# Patient Record
Sex: MALE | Race: WHITE | NOT HISPANIC OR LATINO | Employment: PART TIME | URBAN - METROPOLITAN AREA
[De-identification: names, ages, dates, MRNs, and addresses within clinical notes are randomized per-mention and may not be internally consistent; named-entity substitution may affect disease eponyms.]

---

## 2018-06-18 ENCOUNTER — HOSPITAL ENCOUNTER (EMERGENCY)
Facility: HOSPITAL | Age: 18
End: 2018-06-19
Attending: EMERGENCY MEDICINE | Admitting: EMERGENCY MEDICINE
Payer: COMMERCIAL

## 2018-06-18 DIAGNOSIS — R45.851 SUICIDAL IDEATION: ICD-10-CM

## 2018-06-18 DIAGNOSIS — F32.A DEPRESSION: Primary | ICD-10-CM

## 2018-06-18 LAB
BASOPHILS # BLD AUTO: 0.02 THOUSANDS/ΜL (ref 0–0.1)
BASOPHILS NFR BLD AUTO: 0 % (ref 0–1)
EOSINOPHIL # BLD AUTO: 0.03 THOUSAND/ΜL (ref 0–0.61)
EOSINOPHIL NFR BLD AUTO: 1 % (ref 0–6)
ERYTHROCYTE [DISTWIDTH] IN BLOOD BY AUTOMATED COUNT: 11.8 % (ref 11.6–15.1)
HCT VFR BLD AUTO: 48.5 % (ref 36.5–49.3)
HGB BLD-MCNC: 16.4 G/DL (ref 12–17)
IMM GRANULOCYTES # BLD AUTO: 0 THOUSAND/UL (ref 0–0.2)
IMM GRANULOCYTES NFR BLD AUTO: 0 % (ref 0–2)
LYMPHOCYTES # BLD AUTO: 2.17 THOUSANDS/ΜL (ref 0.6–4.47)
LYMPHOCYTES NFR BLD AUTO: 34 % (ref 14–44)
MCH RBC QN AUTO: 30.3 PG (ref 26.8–34.3)
MCHC RBC AUTO-ENTMCNC: 33.8 G/DL (ref 31.4–37.4)
MCV RBC AUTO: 90 FL (ref 82–98)
MONOCYTES # BLD AUTO: 0.44 THOUSAND/ΜL (ref 0.17–1.22)
MONOCYTES NFR BLD AUTO: 7 % (ref 4–12)
NEUTROPHILS # BLD AUTO: 3.72 THOUSANDS/ΜL (ref 1.85–7.62)
NEUTS SEG NFR BLD AUTO: 58 % (ref 43–75)
NRBC BLD AUTO-RTO: 0 /100 WBCS
PLATELET # BLD AUTO: 261 THOUSANDS/UL (ref 149–390)
PMV BLD AUTO: 9.6 FL (ref 8.9–12.7)
RBC # BLD AUTO: 5.42 MILLION/UL (ref 3.88–5.62)
WBC # BLD AUTO: 6.38 THOUSAND/UL (ref 4.31–10.16)

## 2018-06-18 PROCEDURE — 36415 COLL VENOUS BLD VENIPUNCTURE: CPT | Performed by: EMERGENCY MEDICINE

## 2018-06-18 PROCEDURE — 85025 COMPLETE CBC W/AUTO DIFF WBC: CPT | Performed by: EMERGENCY MEDICINE

## 2018-06-18 PROCEDURE — 80320 DRUG SCREEN QUANTALCOHOLS: CPT | Performed by: EMERGENCY MEDICINE

## 2018-06-18 PROCEDURE — 80053 COMPREHEN METABOLIC PANEL: CPT | Performed by: EMERGENCY MEDICINE

## 2018-06-18 RX ORDER — SERTRALINE HYDROCHLORIDE 100 MG/1
100 TABLET, FILM COATED ORAL
COMMUNITY
End: 2021-12-12

## 2018-06-18 RX ORDER — CLONIDINE HYDROCHLORIDE 0.1 MG/1
0.1 TABLET ORAL
COMMUNITY

## 2018-06-18 RX ORDER — METHYLPHENIDATE HYDROCHLORIDE 30 MG/1
30 CAPSULE, EXTENDED RELEASE ORAL EVERY MORNING
COMMUNITY
End: 2021-12-12

## 2018-06-19 VITALS
WEIGHT: 175 LBS | HEART RATE: 95 BPM | HEIGHT: 73 IN | SYSTOLIC BLOOD PRESSURE: 121 MMHG | RESPIRATION RATE: 18 BRPM | OXYGEN SATURATION: 97 % | DIASTOLIC BLOOD PRESSURE: 70 MMHG | TEMPERATURE: 97.5 F | BODY MASS INDEX: 23.19 KG/M2

## 2018-06-19 LAB
ALBUMIN SERPL BCP-MCNC: 4.9 G/DL (ref 3.5–5)
ALP SERPL-CCNC: 88 U/L (ref 46–484)
ALT SERPL W P-5'-P-CCNC: 20 U/L (ref 12–78)
AMPHETAMINES SERPL QL SCN: NEGATIVE
ANION GAP SERPL CALCULATED.3IONS-SCNC: 11 MMOL/L (ref 4–13)
AST SERPL W P-5'-P-CCNC: 19 U/L (ref 5–45)
BARBITURATES UR QL: NEGATIVE
BENZODIAZ UR QL: NEGATIVE
BILIRUB SERPL-MCNC: 0.7 MG/DL (ref 0.2–1)
BILIRUB UR QL STRIP: NEGATIVE
BUN SERPL-MCNC: 13 MG/DL (ref 5–25)
CALCIUM SERPL-MCNC: 9.6 MG/DL (ref 8.3–10.1)
CHLORIDE SERPL-SCNC: 98 MMOL/L (ref 100–108)
CLARITY UR: CLEAR
CO2 SERPL-SCNC: 29 MMOL/L (ref 21–32)
COCAINE UR QL: NEGATIVE
COLOR UR: YELLOW
CREAT SERPL-MCNC: 0.98 MG/DL (ref 0.6–1.3)
ETHANOL SERPL-MCNC: <3 MG/DL (ref 0–3)
GFR SERPL CREATININE-BSD FRML MDRD: 111 ML/MIN/1.73SQ M
GLUCOSE SERPL-MCNC: 89 MG/DL (ref 65–140)
GLUCOSE UR STRIP-MCNC: NEGATIVE MG/DL
HGB UR QL STRIP.AUTO: NEGATIVE
KETONES UR STRIP-MCNC: NEGATIVE MG/DL
LEUKOCYTE ESTERASE UR QL STRIP: NEGATIVE
METHADONE UR QL: NEGATIVE
NITRITE UR QL STRIP: NEGATIVE
OPIATES UR QL SCN: NEGATIVE
PCP UR QL: NEGATIVE
PH UR STRIP.AUTO: 6.5 [PH] (ref 5–9)
POTASSIUM SERPL-SCNC: 3.5 MMOL/L (ref 3.5–5.3)
PROT SERPL-MCNC: 8.4 G/DL (ref 6.4–8.2)
PROT UR STRIP-MCNC: NEGATIVE MG/DL
SODIUM SERPL-SCNC: 138 MMOL/L (ref 136–145)
SP GR UR STRIP.AUTO: <=1.005 (ref 1–1.03)
THC UR QL: NEGATIVE
UROBILINOGEN UR QL STRIP.AUTO: 0.2 E.U./DL

## 2018-06-19 PROCEDURE — 81003 URINALYSIS AUTO W/O SCOPE: CPT | Performed by: EMERGENCY MEDICINE

## 2018-06-19 PROCEDURE — 80307 DRUG TEST PRSMV CHEM ANLYZR: CPT | Performed by: EMERGENCY MEDICINE

## 2018-06-19 PROCEDURE — 99285 EMERGENCY DEPT VISIT HI MDM: CPT

## 2018-06-19 RX ORDER — CLONIDINE HYDROCHLORIDE 0.1 MG/1
0.1 TABLET ORAL ONCE
Status: COMPLETED | OUTPATIENT
Start: 2018-06-19 | End: 2018-06-19

## 2018-06-19 RX ORDER — ACETAMINOPHEN 325 MG/1
650 TABLET ORAL ONCE
Status: COMPLETED | OUTPATIENT
Start: 2018-06-19 | End: 2018-06-19

## 2018-06-19 RX ORDER — METHYLPHENIDATE HYDROCHLORIDE 5 MG/1
15 TABLET ORAL
Status: DISCONTINUED | OUTPATIENT
Start: 2018-06-19 | End: 2018-06-19 | Stop reason: HOSPADM

## 2018-06-19 RX ADMIN — SERTRALINE HYDROCHLORIDE 100 MG: 50 TABLET ORAL at 02:30

## 2018-06-19 RX ADMIN — ACETAMINOPHEN 650 MG: 325 TABLET ORAL at 02:21

## 2018-06-19 RX ADMIN — METHYLPHENIDATE HYDROCHLORIDE 15 MG: 5 TABLET ORAL at 09:01

## 2018-06-19 RX ADMIN — CLONIDINE HYDROCHLORIDE 0.1 MG: 0.1 TABLET ORAL at 02:30

## 2018-06-19 NOTE — ED NOTES
[facility] at bedside  Breakfast tray collected, 100% of meal consumed        Nikolas Nuñez RN  06/19/18 4214

## 2018-06-19 NOTE — ED NOTES
Pt pleasant and cooperative but very soft spoken  Requested to have his door locked so he could sleep    States he would feel safer     Iwona Rangel RN  06/19/18 7433

## 2018-06-19 NOTE — EMTALA/ACUTE CARE TRANSFER
[address] [address]  Dept: [phone]      EMTALA TRANSFER CONSENT    NAME Delilah Baum                                         2000                              MRN [id number]    I have been informed of my rights regarding examination, treatment, and transfer   by Dr Dara Ledezma DO    Benefits: Specialized equipment and/or services available at the receiving facility (Include comment)________________________    Risks: Potential for delay in receiving treatment, Potential deterioration of medical condition, Increased discomfort during transfer, Possible worsening of condition or death during transfer      Consent for Transfer:  I acknowledge that my medical condition has been evaluated and explained to me by the emergency department physician or other qualified medical person and/or my attending physician, who has recommended that I be transferred to the service of  Accepting Physician: Dr River Dietrich at [facility] Name, [address] : Stephanie Saravia  The above potential benefits of such transfer, the potential risks associated with such transfer, and the probable risks of not being transferred have been explained to me, and I fully understand them  The doctor has explained that, in my case, the benefits of transfer outweigh the risks  I agree to be transferred  I authorize the performance of emergency medical procedures and treatments upon me in both transit and upon arrival at the receiving facility  Additionally, I authorize the release of any and all medical records to the receiving facility and request they be transported with me, if possible  I understand that the safest mode of transportation during a medical emergency is an ambulance and that the Hospital advocates the use of this mode of transport   Risks of traveling to the receiving facility by car, including absence of medical control, life sustaining equipment, such as oxygen, and medical personnel has been explained to me and I fully understand them  (CHHAYA CORRECT BOX BELOW)  [  ]  I consent to the stated transfer and to be transported by ambulance/helicopter  [  ]  I consent to the stated transfer, but refuse transportation by ambulance and accept full responsibility for my transportation by car  I understand the risks of non-ambulance transfers and I exonerate the Hospital and its staff from any deterioration in my condition that results from this refusal     X___________________________________________    DATE  18  TIME________  Signature of patient or legally responsible individual signing on patient behalf           RELATIONSHIP TO PATIENT_________________________          Provider Certification    NAME Marcia Perry                                         2000                              MRN [id number]    A medical screening exam was performed on the above named patient  Based on the examination:    Condition Necessitating Transfer The primary encounter diagnosis was Depression  A diagnosis of Suicidal ideation was also pertinent to this visit      Patient Condition: The patient has been stabilized such that within reasonable medical probability, no material deterioration of the patient condition or the condition of the unborn child(flor) is likely to result from the transfer    Reason for Transfer: Level of Care needed not available at this facility    Transfer Requirements: [address]   · Space available and qualified personnel available for treatment as acknowledged by Stable, calm, cooperative  · Agreed to accept transfer and to provide appropriate medical treatment as acknowledged by       Dr Nhi Soto  · Appropriate medical records of the examination and treatment of the patient are provided at the time of transfer   [address] _______  · Transfer will be performed by qualified personnel from [facility]  and appropriate transfer equipment as required, including the use of necessary and appropriate life support measures  Provider Certification: I have examined the patient and explained the following risks and benefits of being transferred/refusing transfer to the patient/family:  The patient is stable for psychiatric transfer because they are medically stable, and is protected from harming him/herself or others during transport, General risk, such as traffic hazards, adverse weather conditions, rough terrain or turbulence, possible failure of equipment (including vehicle or aircraft), or consequences of actions of persons outside the control of the transport personnel, Unanticipated needs of medical equipment and personnel during transport, Risk of worsening condition, The possibility of a transport vehicle being unavailable      Based on these reasonable risks and benefits to the patient and/or the unborn child(flor), and based upon the information available at the time of the patients examination, I certify that the medical benefits reasonably to be expected from the provision of appropriate medical treatments at another medical facility outweigh the increasing risks, if any, to the individuals medical condition, and in the case of labor to the unborn child, from effecting the transfer      X____________________________________________ DATE 06/19/18        TIME_______      ORIGINAL - SEND TO MEDICAL RECORDS   COPY - SEND WITH PATIENT DURING TRANSFER

## 2018-06-19 NOTE — ED PROVIDER NOTES
History  Chief Complaint   Patient presents with    Psychiatric Evaluation     Brought by mother  Both patient and mother are crying  Has been taking Zoloft since  and being seen by a therapist for a month  Mother states a family cat  2 weeks ago and patient " became a mess "   Told mother he has been cutting self and told his mother today that he has wanted to harm himself for 2-3 days  He states he would overdose  64-year-old male brought in by his mother for evaluation of depression and suicidal ideation  Both patient and mother are crying  Has been taking Zoloft since  and being seen by a therapist for a month  Mother states a family cat  2 weeks ago and patient " became a mess "   Told mother he has been cutting self and told his mother today that he has wanted to harm himself for 2-3 days  He states he would overdose  Patient denies drugs or alcohol  Compliant with medications  History of anxiety as well as depression  Decreased appetite and decreased activity level  History provided by:  Patient  Psychiatric Evaluation   Presenting symptoms: depression, self-mutilation and suicidal thoughts    Patient accompanied by:  Family member  Degree of incapacity (severity): Moderate  Onset quality:  Gradual  Duration:  2 weeks  Timing:  Constant  Progression:  Worsening  Chronicity:  Recurrent  Context: stressful life event    Context: not alcohol use, not drug abuse and not noncompliant    Treatment compliance:  Most of the time  Relieved by:  Nothing  Ineffective treatments:  Antidepressants  Associated symptoms: anxiety and appetite change    Associated symptoms: no chest pain and no feelings of worthlessness    Risk factors: no hx of suicide attempts        Prior to Admission Medications   Prescriptions Last Dose Informant Patient Reported? Taking?    cloNIDine (CATAPRES) 0 1 mg tablet 2018 at Unknown time Self Yes Yes   Sig: Take 0 1 mg by mouth daily at bedtime methylphenidate (RITALIN LA) 30 MG 24 hr capsule 6/18/2018 at Unknown time Self Yes Yes   Sig: Take 30 mg by mouth every morning   sertraline (ZOLOFT) 100 mg tablet 6/17/2018 at Unknown time Self Yes Yes   Sig: Take 100 mg by mouth daily at bedtime      Facility-Administered Medications: None       Past Medical History:   Diagnosis Date    ADHD     Anxiety disorder     Depression        History reviewed  No pertinent surgical history  History reviewed  No pertinent family history  I have reviewed and agree with the history as documented  Social History   Substance Use Topics    Smoking status: Never Smoker    Smokeless tobacco: Never Used    Alcohol use No        Review of Systems   Constitutional: Positive for appetite change  HENT: Negative  Respiratory: Negative  Cardiovascular: Negative  Negative for chest pain, palpitations and leg swelling  Gastrointestinal: Negative  Genitourinary: Negative  Musculoskeletal: Negative  Skin: Positive for wound  Self-inflicted wounds   Neurological: Negative  Hematological: Negative  Psychiatric/Behavioral: Positive for self-injury and suicidal ideas  The patient is nervous/anxious  All other systems reviewed and are negative  Physical Exam  Physical Exam   Constitutional: He is oriented to person, place, and time  He appears well-developed and well-nourished  HENT:   Head: Normocephalic and atraumatic  Right Ear: External ear normal    Left Ear: External ear normal    Nose: Nose normal    Mouth/Throat: Oropharynx is clear and moist    Eyes: Conjunctivae and EOM are normal    Neck: Normal range of motion  Neck supple  Cardiovascular: Normal rate, regular rhythm, normal heart sounds and intact distal pulses  Pulmonary/Chest: Effort normal and breath sounds normal    Abdominal: Soft  Bowel sounds are normal    Musculoskeletal: Normal range of motion     Neurological: He is alert and oriented to person, place, and time    Skin: Skin is warm and dry  Capillary refill takes less than 2 seconds  Superficial scratches to the right forearm that are well healed in over several weeks old  Psychiatric: Judgment normal  He is slowed  He exhibits a depressed mood  He expresses suicidal ideation  He expresses suicidal plans  Flat affect with poor eye contact, tearful   Nursing note and vitals reviewed  Vital Signs  ED Triage Vitals [06/18/18 2257]   Temperature Pulse Respirations Blood Pressure SpO2   99 1 °F (37 3 °C) 100 20 135/73 100 %      Temp Source Heart Rate Source Patient Position - Orthostatic VS BP Location FiO2 (%)   Tympanic Monitor Sitting Left arm --      Pain Score       6           Vitals:    06/18/18 2257 06/18/18 2324   BP: 135/73 135/86   Pulse: 100 89   Patient Position - Orthostatic VS: Sitting        Visual Acuity      ED Medications  Medications   acetaminophen (TYLENOL) tablet 650 mg (650 mg Oral Given 6/19/18 0221)   sertraline (ZOLOFT) tablet 100 mg (100 mg Oral Given 6/19/18 0230)   cloNIDine (CATAPRES) tablet 0 1 mg (0 1 mg Oral Given 6/19/18 0230)       Diagnostic Studies  Results Reviewed     Procedure Component Value Units Date/Time    Rapid drug screen, urine [[id number]]  (Normal) Collected:  06/19/18 0034    Lab Status:  Final result Specimen:  Urine from Urine, Clean Catch Updated:  06/19/18 0056     Amph/Meth UR Negative     Barbiturate Ur Negative     Benzodiazepine Urine Negative     Cocaine Urine Negative     Methadone Urine Negative     Opiate Urine Negative     PCP Ur Negative     THC Urine Negative    Narrative:         FOR MEDICAL PURPOSES ONLY  IF CONFIRMATION NEEDED PLEASE CONTACT THE LAB WITHIN 5 DAYS      Drug Screen Cutoff Levels:  AMPHETAMINE/METHAMPHETAMINES  1000 ng/mL  BARBITURATES     200 ng/mL  BENZODIAZEPINES     200 ng/mL  COCAINE      300 ng/mL  METHADONE      300 ng/mL  OPIATES      300 ng/mL  PHENCYCLIDINE     25 ng/mL  THC       50 ng/mL    UA w Reflex to Microscopic w Reflex to Culture [[id number]] Collected:  06/19/18 0034    Lab Status:  Final result Specimen:  Urine from Urine, Clean Catch Updated:  06/19/18 0044     Color, UA Yellow     Clarity, UA Clear     Specific Gravity, UA <=1 005     pH, UA 6 5     Leukocytes, UA Negative     Nitrite, UA Negative     Protein, UA Negative mg/dl      Glucose, UA Negative mg/dl      Ketones, UA Negative mg/dl      Urobilinogen, UA 0 2 E U /dl      Bilirubin, UA Negative     Blood, UA Negative    Ethanol [[id number]]  (Normal) Collected:  06/18/18 2328    Lab Status:  Final result Specimen:  Blood from Arm, Left Updated:  06/19/18 0018     Ethanol Lvl <3 0 mg/dL     Comprehensive metabolic panel [[id number]]  (Abnormal) Collected:  06/18/18 2328    Lab Status:  Final result Specimen:  Blood from Arm, Left Updated:  06/19/18 0009     Sodium 138 mmol/L      Potassium 3 5 mmol/L      Chloride 98 (L) mmol/L      CO2 29 mmol/L      Anion Gap 11 mmol/L      BUN 13 mg/dL      Creatinine 0 98 mg/dL      Glucose 89 mg/dL      Calcium 9 6 mg/dL      AST 19 U/L      ALT 20 U/L      Alkaline Phosphatase 88 U/L      Total Protein 8 4 (H) g/dL      Albumin 4 9 g/dL      Total Bilirubin 0 70 mg/dL      eGFR 111 ml/min/1 73sq m     Narrative:         National Kidney Disease Education Program recommendations are as follows:  GFR calculation is accurate only with a steady state creatinine  Chronic Kidney disease less than 60 ml/min/1 73 sq  meters  Kidney failure less than 15 ml/min/1 73 sq  meters      CBC and differential [[id number]] Collected:  06/18/18 2328    Lab Status:  Final result Specimen:  Blood from Arm, Left Updated:  06/18/18 2348     WBC 6 38 Thousand/uL      RBC 5 42 Million/uL      Hemoglobin 16 4 g/dL      Hematocrit 48 5 %      MCV 90 fL      MCH 30 3 pg      MCHC 33 8 g/dL      RDW 11 8 %      MPV 9 6 fL      Platelets 792 Thousands/uL      nRBC 0 /100 WBCs      Neutrophils Relative 58 %      Immat GRANS % 0 %      Lymphocytes Relative 34 % Monocytes Relative 7 %      Eosinophils Relative 1 %      Basophils Relative 0 %      Neutrophils Absolute 3 72 Thousands/µL      Immature Grans Absolute 0 00 Thousand/uL      Lymphocytes Absolute 2 17 Thousands/µL      Monocytes Absolute 0 44 Thousand/µL      Eosinophils Absolute 0 03 Thousand/µL      Basophils Absolute 0 02 Thousands/µL                  No orders to display              Procedures  Procedures       Phone Contacts  ED Phone Contact    ED Course                               MDM  Number of Diagnoses or Management Options  Diagnosis management comments: Medically cleared for crisis    CritCare Time    Disposition  Final diagnoses:   Depression   Suicidal ideation     Time reflects when diagnosis was documented in both MDM as applicable and the Disposition within this note     Time User Action Codes Description Comment    6/19/2018 12:37 AM Lilliana Cottrell Add [F32 9] Depression     6/19/2018 12:38 AM Lino Zayas Suicidal ideation       ED Disposition     None      Follow-up Information    None         Patient's Medications   Discharge Prescriptions    No medications on file     No discharge procedures on file      ED Provider  Electronically Signed by           Nestor Peterson MD  06/19/18 9414

## 2018-06-19 NOTE — ED NOTES
Patient awake and on the phone with his mother  Updating her about transfer  Patient remains calm and cooperative       Jerrod Musa RN  06/19/18 1369

## 2018-06-19 NOTE — ED NOTES
Mother present at time of transport  Patient calm, cooperative, but tearful at times  Belongings given to EMS transport team  Patient discharged to facility       Nikolas Nuñez RN  06/19/18 9758

## 2018-06-19 NOTE — ED NOTES
Pt  Encouraged to urinate  Patient tried to urinate states "I just can't pee right now  I will try again later " Charge nurse notified        Erasto Olson RN  06/18/18 4883

## 2018-06-19 NOTE — PROGRESS NOTES
6/19/18 @ 0700:  Received update from Seng Fier[facility] at [facility]; patient referred to [facility]; PES will follow up  Alana Rojas, [facility]: As per Seng Fierro, patient accepted at [facility] by Dr Loco Madrid; UR:  Wendi Marie @ [phone]; will accept between 12-2pm; TID: [id number]; will call for precert  MS Emelina  0820: PES called precert at [facility] @ [phone]; spoke to [place]:  Auth:  [id number]  Days:  7 days  Review:  6/25/18  UR: Tonya Meehan  #: [phone]  Alana Rojas, [address] [place] [address]: PES called [facility]:   @ 1300   0845: PES called [facility] with authorization information and RN-RN #: [phone]; patient signed 12 and was placed in envelope on patient's chart; RN, MD, and patient notified    Alana Rojas MS

## 2020-04-13 ENCOUNTER — TELEMEDICINE (OUTPATIENT)
Dept: FAMILY MEDICINE CLINIC | Facility: CLINIC | Age: 20
End: 2020-04-13
Payer: COMMERCIAL

## 2020-04-13 DIAGNOSIS — Z20.828 EXPOSURE TO SARS-ASSOCIATED CORONAVIRUS: Primary | ICD-10-CM

## 2020-04-13 PROCEDURE — G2012 BRIEF CHECK IN BY MD/QHP: HCPCS | Performed by: FAMILY MEDICINE

## 2020-04-14 DIAGNOSIS — Z20.828 EXPOSURE TO SARS-ASSOCIATED CORONAVIRUS: ICD-10-CM

## 2020-04-20 LAB — CORONAVIRUS 2019-NCOV: NOT DETECTED

## 2021-12-12 ENCOUNTER — HOSPITAL ENCOUNTER (EMERGENCY)
Facility: HOSPITAL | Age: 21
End: 2021-12-16
Attending: EMERGENCY MEDICINE | Admitting: EMERGENCY MEDICINE
Payer: COMMERCIAL

## 2021-12-12 DIAGNOSIS — R45.851 SUICIDAL IDEATION: Primary | ICD-10-CM

## 2021-12-12 DIAGNOSIS — F32.A DEPRESSION: ICD-10-CM

## 2021-12-12 LAB
ALBUMIN SERPL BCP-MCNC: 4.5 G/DL (ref 3.5–5)
ALP SERPL-CCNC: 49 U/L (ref 46–116)
ALT SERPL W P-5'-P-CCNC: 45 U/L (ref 12–78)
AMPHETAMINES SERPL QL SCN: NEGATIVE
ANION GAP SERPL CALCULATED.3IONS-SCNC: 11 MMOL/L (ref 4–13)
AST SERPL W P-5'-P-CCNC: 68 U/L (ref 5–45)
BARBITURATES UR QL: NEGATIVE
BASOPHILS # BLD AUTO: 0.02 THOUSANDS/ΜL (ref 0–0.1)
BASOPHILS NFR BLD AUTO: 0 % (ref 0–1)
BENZODIAZ UR QL: NEGATIVE
BILIRUB SERPL-MCNC: 0.61 MG/DL (ref 0.2–1)
BILIRUB UR QL STRIP: NEGATIVE
BUN SERPL-MCNC: 13 MG/DL (ref 5–25)
CALCIUM SERPL-MCNC: 9.6 MG/DL (ref 8.3–10.1)
CHLORIDE SERPL-SCNC: 104 MMOL/L (ref 100–108)
CLARITY UR: NORMAL
CO2 SERPL-SCNC: 27 MMOL/L (ref 21–32)
COCAINE UR QL: NEGATIVE
COLOR UR: YELLOW
CREAT SERPL-MCNC: 0.89 MG/DL (ref 0.6–1.3)
EOSINOPHIL # BLD AUTO: 0.15 THOUSAND/ΜL (ref 0–0.61)
EOSINOPHIL NFR BLD AUTO: 2 % (ref 0–6)
ERYTHROCYTE [DISTWIDTH] IN BLOOD BY AUTOMATED COUNT: 12.3 % (ref 11.6–15.1)
ETHANOL SERPL-MCNC: <3 MG/DL (ref 0–3)
GFR SERPL CREATININE-BSD FRML MDRD: 121 ML/MIN/1.73SQ M
GLUCOSE SERPL-MCNC: 96 MG/DL (ref 65–140)
GLUCOSE UR STRIP-MCNC: NEGATIVE MG/DL
HCT VFR BLD AUTO: 43.7 % (ref 36.5–49.3)
HGB BLD-MCNC: 14.5 G/DL (ref 12–17)
HGB UR QL STRIP.AUTO: NEGATIVE
IMM GRANULOCYTES # BLD AUTO: 0.02 THOUSAND/UL (ref 0–0.2)
IMM GRANULOCYTES NFR BLD AUTO: 0 % (ref 0–2)
KETONES UR STRIP-MCNC: NEGATIVE MG/DL
LEUKOCYTE ESTERASE UR QL STRIP: NEGATIVE
LYMPHOCYTES # BLD AUTO: 1.46 THOUSANDS/ΜL (ref 0.6–4.47)
LYMPHOCYTES NFR BLD AUTO: 19 % (ref 14–44)
MCH RBC QN AUTO: 30.7 PG (ref 26.8–34.3)
MCHC RBC AUTO-ENTMCNC: 33.2 G/DL (ref 31.4–37.4)
MCV RBC AUTO: 92 FL (ref 82–98)
METHADONE UR QL: NEGATIVE
MONOCYTES # BLD AUTO: 0.44 THOUSAND/ΜL (ref 0.17–1.22)
MONOCYTES NFR BLD AUTO: 6 % (ref 4–12)
NEUTROPHILS # BLD AUTO: 5.43 THOUSANDS/ΜL (ref 1.85–7.62)
NEUTS SEG NFR BLD AUTO: 73 % (ref 43–75)
NITRITE UR QL STRIP: NEGATIVE
NRBC BLD AUTO-RTO: 0 /100 WBCS
OPIATES UR QL SCN: NEGATIVE
OXYCODONE+OXYMORPHONE UR QL SCN: NEGATIVE
PCP UR QL: NEGATIVE
PH UR STRIP.AUTO: 7.5 [PH]
PLATELET # BLD AUTO: 252 THOUSANDS/UL (ref 149–390)
PMV BLD AUTO: 9.4 FL (ref 8.9–12.7)
POTASSIUM SERPL-SCNC: 3.6 MMOL/L (ref 3.5–5.3)
PROT SERPL-MCNC: 7.7 G/DL (ref 6.4–8.2)
PROT UR STRIP-MCNC: NEGATIVE MG/DL
RBC # BLD AUTO: 4.73 MILLION/UL (ref 3.88–5.62)
SODIUM SERPL-SCNC: 142 MMOL/L (ref 136–145)
SP GR UR STRIP.AUTO: 1.02 (ref 1–1.03)
THC UR QL: POSITIVE
UROBILINOGEN UR QL STRIP.AUTO: 1 E.U./DL
WBC # BLD AUTO: 7.52 THOUSAND/UL (ref 4.31–10.16)

## 2021-12-12 PROCEDURE — 82077 ASSAY SPEC XCP UR&BREATH IA: CPT | Performed by: EMERGENCY MEDICINE

## 2021-12-12 PROCEDURE — 81003 URINALYSIS AUTO W/O SCOPE: CPT | Performed by: EMERGENCY MEDICINE

## 2021-12-12 PROCEDURE — 85025 COMPLETE CBC W/AUTO DIFF WBC: CPT | Performed by: EMERGENCY MEDICINE

## 2021-12-12 PROCEDURE — 99285 EMERGENCY DEPT VISIT HI MDM: CPT

## 2021-12-12 PROCEDURE — 80053 COMPREHEN METABOLIC PANEL: CPT | Performed by: EMERGENCY MEDICINE

## 2021-12-12 PROCEDURE — 36415 COLL VENOUS BLD VENIPUNCTURE: CPT | Performed by: EMERGENCY MEDICINE

## 2021-12-12 PROCEDURE — 99285 EMERGENCY DEPT VISIT HI MDM: CPT | Performed by: EMERGENCY MEDICINE

## 2021-12-12 PROCEDURE — 0241U HB NFCT DS VIR RESP RNA 4 TRGT: CPT | Performed by: EMERGENCY MEDICINE

## 2021-12-12 PROCEDURE — 80307 DRUG TEST PRSMV CHEM ANLYZR: CPT | Performed by: EMERGENCY MEDICINE

## 2021-12-13 LAB
FLUAV RNA RESP QL NAA+PROBE: NEGATIVE
FLUBV RNA RESP QL NAA+PROBE: NEGATIVE
RSV RNA RESP QL NAA+PROBE: NEGATIVE
SARS-COV-2 RNA RESP QL NAA+PROBE: NEGATIVE

## 2021-12-13 PROCEDURE — 96372 THER/PROPH/DIAG INJ SC/IM: CPT

## 2021-12-13 RX ORDER — LANOLIN ALCOHOL/MO/W.PET/CERES
3 CREAM (GRAM) TOPICAL
Status: DISCONTINUED | OUTPATIENT
Start: 2021-12-13 | End: 2021-12-16 | Stop reason: HOSPADM

## 2021-12-13 RX ORDER — NICOTINE 21 MG/24HR
21 PATCH, TRANSDERMAL 24 HOURS TRANSDERMAL ONCE
Status: COMPLETED | OUTPATIENT
Start: 2021-12-13 | End: 2021-12-14

## 2021-12-13 RX ORDER — CLONIDINE HYDROCHLORIDE 0.1 MG/1
0.1 TABLET ORAL ONCE
Status: COMPLETED | OUTPATIENT
Start: 2021-12-13 | End: 2021-12-16

## 2021-12-13 RX ORDER — LORAZEPAM 2 MG/ML
2 INJECTION INTRAMUSCULAR ONCE
Status: COMPLETED | OUTPATIENT
Start: 2021-12-13 | End: 2021-12-13

## 2021-12-13 RX ORDER — HALOPERIDOL 5 MG/ML
5 INJECTION INTRAMUSCULAR ONCE
Status: COMPLETED | OUTPATIENT
Start: 2021-12-13 | End: 2021-12-13

## 2021-12-13 RX ADMIN — HALOPERIDOL LACTATE 5 MG: 5 INJECTION, SOLUTION INTRAMUSCULAR at 17:25

## 2021-12-13 RX ADMIN — LORAZEPAM 2 MG: 2 INJECTION INTRAMUSCULAR; INTRAVENOUS at 12:50

## 2021-12-13 RX ADMIN — NICOTINE 21 MG: 21 PATCH, EXTENDED RELEASE TRANSDERMAL at 01:27

## 2021-12-13 RX ADMIN — LORAZEPAM 2 MG: 2 INJECTION INTRAMUSCULAR; INTRAVENOUS at 17:25

## 2021-12-14 PROCEDURE — 96372 THER/PROPH/DIAG INJ SC/IM: CPT

## 2021-12-14 RX ORDER — LORAZEPAM 1 MG/1
2 TABLET ORAL ONCE
Status: COMPLETED | OUTPATIENT
Start: 2021-12-14 | End: 2021-12-14

## 2021-12-14 RX ORDER — NICOTINE 21 MG/24HR
14 PATCH, TRANSDERMAL 24 HOURS TRANSDERMAL ONCE
Status: COMPLETED | OUTPATIENT
Start: 2021-12-14 | End: 2021-12-15

## 2021-12-14 RX ORDER — LORAZEPAM 2 MG/ML
2 INJECTION INTRAMUSCULAR ONCE
Status: COMPLETED | OUTPATIENT
Start: 2021-12-14 | End: 2021-12-14

## 2021-12-14 RX ORDER — OLANZAPINE 10 MG/1
10 INJECTION, POWDER, LYOPHILIZED, FOR SOLUTION INTRAMUSCULAR ONCE
Status: COMPLETED | OUTPATIENT
Start: 2021-12-14 | End: 2021-12-14

## 2021-12-14 RX ORDER — ARIPIPRAZOLE 5 MG/1
5 TABLET ORAL DAILY
Status: DISCONTINUED | OUTPATIENT
Start: 2021-12-14 | End: 2021-12-16 | Stop reason: HOSPADM

## 2021-12-14 RX ADMIN — LORAZEPAM 2 MG: 2 INJECTION INTRAMUSCULAR; INTRAVENOUS at 18:19

## 2021-12-14 RX ADMIN — LORAZEPAM 2 MG: 1 TABLET ORAL at 10:08

## 2021-12-14 RX ADMIN — OLANZAPINE 10 MG: 10 INJECTION, POWDER, FOR SOLUTION INTRAMUSCULAR at 18:19

## 2021-12-14 RX ADMIN — ARIPIPRAZOLE 5 MG: 5 TABLET ORAL at 08:42

## 2021-12-14 RX ADMIN — NICOTINE 14 MG: 14 PATCH, EXTENDED RELEASE TRANSDERMAL at 14:25

## 2021-12-15 PROCEDURE — 0241U HB NFCT DS VIR RESP RNA 4 TRGT: CPT | Performed by: EMERGENCY MEDICINE

## 2021-12-15 PROCEDURE — 96372 THER/PROPH/DIAG INJ SC/IM: CPT

## 2021-12-15 RX ORDER — NICOTINE 21 MG/24HR
14 PATCH, TRANSDERMAL 24 HOURS TRANSDERMAL DAILY
Status: DISCONTINUED | OUTPATIENT
Start: 2021-12-15 | End: 2021-12-16 | Stop reason: HOSPADM

## 2021-12-15 RX ORDER — LORAZEPAM 1 MG/1
2 TABLET ORAL ONCE
Status: COMPLETED | OUTPATIENT
Start: 2021-12-15 | End: 2021-12-15

## 2021-12-15 RX ORDER — LORAZEPAM 2 MG/ML
2 INJECTION INTRAMUSCULAR ONCE
Status: COMPLETED | OUTPATIENT
Start: 2021-12-15 | End: 2021-12-15

## 2021-12-15 RX ORDER — LORAZEPAM 2 MG/ML
INJECTION INTRAMUSCULAR
Status: COMPLETED
Start: 2021-12-15 | End: 2021-12-15

## 2021-12-15 RX ORDER — OLANZAPINE 10 MG/1
INJECTION, POWDER, LYOPHILIZED, FOR SOLUTION INTRAMUSCULAR
Status: COMPLETED
Start: 2021-12-15 | End: 2021-12-15

## 2021-12-15 RX ORDER — OLANZAPINE 10 MG/1
10 INJECTION, POWDER, LYOPHILIZED, FOR SOLUTION INTRAMUSCULAR ONCE
Status: COMPLETED | OUTPATIENT
Start: 2021-12-15 | End: 2021-12-15

## 2021-12-15 RX ADMIN — OLANZAPINE 10 MG: 10 INJECTION, POWDER, FOR SOLUTION INTRAMUSCULAR at 23:45

## 2021-12-15 RX ADMIN — NICOTINE 14 MG: 14 PATCH, EXTENDED RELEASE TRANSDERMAL at 14:20

## 2021-12-15 RX ADMIN — LORAZEPAM 2 MG: 2 INJECTION INTRAMUSCULAR at 23:45

## 2021-12-15 RX ADMIN — OLANZAPINE 10 MG: 10 INJECTION, POWDER, LYOPHILIZED, FOR SOLUTION INTRAMUSCULAR at 23:45

## 2021-12-15 RX ADMIN — LORAZEPAM 2 MG: 1 TABLET ORAL at 14:16

## 2021-12-15 RX ADMIN — ARIPIPRAZOLE 5 MG: 5 TABLET ORAL at 14:20

## 2021-12-15 RX ADMIN — LORAZEPAM 2 MG: 2 INJECTION INTRAMUSCULAR; INTRAVENOUS at 23:45

## 2021-12-15 RX ADMIN — LORAZEPAM 2 MG: 1 TABLET ORAL at 05:59

## 2021-12-15 RX ADMIN — LORAZEPAM 2 MG: 1 TABLET ORAL at 17:29

## 2021-12-16 VITALS
BODY MASS INDEX: 21.87 KG/M2 | HEART RATE: 110 BPM | RESPIRATION RATE: 18 BRPM | WEIGHT: 165 LBS | HEIGHT: 73 IN | TEMPERATURE: 98.1 F | OXYGEN SATURATION: 98 % | DIASTOLIC BLOOD PRESSURE: 86 MMHG | SYSTOLIC BLOOD PRESSURE: 119 MMHG

## 2021-12-16 PROCEDURE — 96372 THER/PROPH/DIAG INJ SC/IM: CPT

## 2021-12-16 RX ORDER — OLANZAPINE 10 MG/1
10 INJECTION, POWDER, LYOPHILIZED, FOR SOLUTION INTRAMUSCULAR ONCE
Status: COMPLETED | OUTPATIENT
Start: 2021-12-16 | End: 2021-12-16

## 2021-12-16 RX ORDER — LORAZEPAM 2 MG/ML
2 INJECTION INTRAMUSCULAR ONCE
Status: COMPLETED | OUTPATIENT
Start: 2021-12-16 | End: 2021-12-16

## 2021-12-16 RX ADMIN — LORAZEPAM 2 MG: 2 INJECTION INTRAMUSCULAR; INTRAVENOUS at 12:41

## 2021-12-16 RX ADMIN — CLONIDINE HYDROCHLORIDE 0.1 MG: 0.1 TABLET ORAL at 01:48

## 2021-12-16 RX ADMIN — ARIPIPRAZOLE 5 MG: 5 TABLET ORAL at 12:42

## 2021-12-16 RX ADMIN — OLANZAPINE 10 MG: 10 INJECTION, POWDER, FOR SOLUTION INTRAMUSCULAR at 12:41

## 2021-12-16 RX ADMIN — NICOTINE 14 MG: 14 PATCH, EXTENDED RELEASE TRANSDERMAL at 12:42

## 2021-12-16 RX ADMIN — Medication 3 MG: at 01:46

## 2025-06-28 ENCOUNTER — ANESTHESIA (EMERGENCY)
Dept: PERIOP | Facility: HOSPITAL | Age: 25
DRG: 580 | End: 2025-06-28
Payer: COMMERCIAL

## 2025-06-28 ENCOUNTER — ANESTHESIA EVENT (EMERGENCY)
Dept: PERIOP | Facility: HOSPITAL | Age: 25
DRG: 580 | End: 2025-06-28
Payer: COMMERCIAL

## 2025-06-28 ENCOUNTER — HOSPITAL ENCOUNTER (INPATIENT)
Facility: HOSPITAL | Age: 25
LOS: 3 days | DRG: 580 | End: 2025-07-01
Attending: SURGERY | Admitting: SURGERY
Payer: COMMERCIAL
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
## 2025-06-28 DIAGNOSIS — Z00.8 MEDICAL CLEARANCE FOR PSYCHIATRIC ADMISSION: ICD-10-CM

## 2025-06-28 DIAGNOSIS — S51.812A LACERATION OF LEFT FOREARM: ICD-10-CM

## 2025-06-28 DIAGNOSIS — S51.812A LACERATION OF LEFT FOREARM, INITIAL ENCOUNTER: ICD-10-CM

## 2025-06-28 DIAGNOSIS — S51.811A LACERATION OF RIGHT FOREARM: ICD-10-CM

## 2025-06-28 LAB
ABO GROUP BLD: NORMAL
BLD GP AB SCN SERPL QL: NEGATIVE
RH BLD: POSITIVE
SPECIMEN EXPIRATION DATE: NORMAL

## 2025-06-28 PROCEDURE — 90471 IMMUNIZATION ADMIN: CPT

## 2025-06-28 PROCEDURE — 82330 ASSAY OF CALCIUM: CPT

## 2025-06-28 PROCEDURE — 99223 1ST HOSP IP/OBS HIGH 75: CPT | Performed by: SURGERY

## 2025-06-28 PROCEDURE — 0JQH0ZZ REPAIR LEFT LOWER ARM SUBCUTANEOUS TISSUE AND FASCIA, OPEN APPROACH: ICD-10-PCS | Performed by: SURGERY

## 2025-06-28 PROCEDURE — 90715 TDAP VACCINE 7 YRS/> IM: CPT | Performed by: SURGERY

## 2025-06-28 PROCEDURE — 82803 BLOOD GASES ANY COMBINATION: CPT

## 2025-06-28 PROCEDURE — 97605 NEG PRS WND THER DME<=50SQCM: CPT | Performed by: SURGERY

## 2025-06-28 PROCEDURE — 36415 COLL VENOUS BLD VENIPUNCTURE: CPT | Performed by: SURGERY

## 2025-06-28 PROCEDURE — 85014 HEMATOCRIT: CPT

## 2025-06-28 PROCEDURE — 99285 EMERGENCY DEPT VISIT HI MDM: CPT

## 2025-06-28 PROCEDURE — 13122 CMPLX RPR S/A/L ADDL 5 CM/>: CPT | Performed by: SURGERY

## 2025-06-28 PROCEDURE — 86900 BLOOD TYPING SEROLOGIC ABO: CPT | Performed by: SURGERY

## 2025-06-28 PROCEDURE — 84132 ASSAY OF SERUM POTASSIUM: CPT

## 2025-06-28 PROCEDURE — 96374 THER/PROPH/DIAG INJ IV PUSH: CPT

## 2025-06-28 PROCEDURE — EDAIR PR ED AIR: Performed by: EMERGENCY MEDICINE

## 2025-06-28 PROCEDURE — 84295 ASSAY OF SERUM SODIUM: CPT

## 2025-06-28 PROCEDURE — 13121 CMPLX RPR S/A/L 2.6-7.5 CM: CPT | Performed by: SURGERY

## 2025-06-28 PROCEDURE — 86850 RBC ANTIBODY SCREEN: CPT | Performed by: SURGERY

## 2025-06-28 PROCEDURE — 86901 BLOOD TYPING SEROLOGIC RH(D): CPT | Performed by: SURGERY

## 2025-06-28 RX ORDER — DEXAMETHASONE SODIUM PHOSPHATE 10 MG/ML
INJECTION, SOLUTION INTRAMUSCULAR; INTRAVENOUS AS NEEDED
Status: DISCONTINUED | OUTPATIENT
Start: 2025-06-28 | End: 2025-06-29

## 2025-06-28 RX ORDER — PROPOFOL 10 MG/ML
INJECTION, EMULSION INTRAVENOUS AS NEEDED
Status: DISCONTINUED | OUTPATIENT
Start: 2025-06-28 | End: 2025-06-29

## 2025-06-28 RX ORDER — LIDOCAINE HYDROCHLORIDE 10 MG/ML
INJECTION, SOLUTION EPIDURAL; INFILTRATION; INTRACAUDAL; PERINEURAL AS NEEDED
Status: DISCONTINUED | OUTPATIENT
Start: 2025-06-28 | End: 2025-06-29

## 2025-06-28 RX ORDER — MAGNESIUM HYDROXIDE 1200 MG/15ML
LIQUID ORAL AS NEEDED
Status: DISCONTINUED | OUTPATIENT
Start: 2025-06-28 | End: 2025-06-29 | Stop reason: HOSPADM

## 2025-06-28 RX ORDER — NEOMYCIN SULFATE, POLYMYXIN B SULFATE AND BACITRACIN ZINC 3.5; 10000; 4 MG/G; [USP'U]/G; [USP'U]/G
OINTMENT OPHTHALMIC AS NEEDED
Status: DISCONTINUED | OUTPATIENT
Start: 2025-06-28 | End: 2025-06-29 | Stop reason: HOSPADM

## 2025-06-28 RX ORDER — MIDAZOLAM HYDROCHLORIDE 2 MG/2ML
INJECTION, SOLUTION INTRAMUSCULAR; INTRAVENOUS AS NEEDED
Status: DISCONTINUED | OUTPATIENT
Start: 2025-06-28 | End: 2025-06-29

## 2025-06-28 RX ORDER — ONDANSETRON 2 MG/ML
INJECTION INTRAMUSCULAR; INTRAVENOUS AS NEEDED
Status: DISCONTINUED | OUTPATIENT
Start: 2025-06-28 | End: 2025-06-29

## 2025-06-28 RX ORDER — OXYCODONE HYDROCHLORIDE 5 MG/1
5 TABLET ORAL EVERY 6 HOURS PRN
Refills: 0 | Status: DISCONTINUED | OUTPATIENT
Start: 2025-06-28 | End: 2025-06-29

## 2025-06-28 RX ORDER — FENTANYL CITRATE 50 UG/ML
INJECTION, SOLUTION INTRAMUSCULAR; INTRAVENOUS AS NEEDED
Status: DISCONTINUED | OUTPATIENT
Start: 2025-06-28 | End: 2025-06-29

## 2025-06-28 RX ORDER — FENTANYL CITRATE 50 UG/ML
INJECTION, SOLUTION INTRAMUSCULAR; INTRAVENOUS CODE/TRAUMA/SEDATION MEDICATION
Status: COMPLETED | OUTPATIENT
Start: 2025-06-28 | End: 2025-06-28

## 2025-06-28 RX ORDER — HYDROMORPHONE HCL/PF 1 MG/ML
0.5 SYRINGE (ML) INJECTION EVERY 4 HOURS PRN
Status: DISCONTINUED | OUTPATIENT
Start: 2025-06-28 | End: 2025-07-01

## 2025-06-28 RX ORDER — SODIUM CHLORIDE 9 MG/ML
INJECTION, SOLUTION INTRAVENOUS CONTINUOUS PRN
Status: DISCONTINUED | OUTPATIENT
Start: 2025-06-28 | End: 2025-06-29

## 2025-06-28 RX ORDER — ROCURONIUM BROMIDE 10 MG/ML
INJECTION, SOLUTION INTRAVENOUS AS NEEDED
Status: DISCONTINUED | OUTPATIENT
Start: 2025-06-28 | End: 2025-06-29

## 2025-06-28 RX ORDER — SUCCINYLCHOLINE/SOD CL,ISO/PF 100 MG/5ML
SYRINGE (ML) INTRAVENOUS AS NEEDED
Status: DISCONTINUED | OUTPATIENT
Start: 2025-06-28 | End: 2025-06-29

## 2025-06-28 RX ADMIN — DEXMEDETOMIDINE HYDROCHLORIDE 12 MCG: 100 INJECTION, SOLUTION INTRAVENOUS at 23:17

## 2025-06-28 RX ADMIN — Medication 100 MG: at 22:05

## 2025-06-28 RX ADMIN — FENTANYL CITRATE 50 MCG: 50 INJECTION INTRAMUSCULAR; INTRAVENOUS at 22:53

## 2025-06-28 RX ADMIN — SUGAMMADEX 400 MG: 100 INJECTION, SOLUTION INTRAVENOUS at 23:42

## 2025-06-28 RX ADMIN — ROCURONIUM BROMIDE 50 MG: 10 INJECTION, SOLUTION INTRAVENOUS at 22:28

## 2025-06-28 RX ADMIN — PROPOFOL 200 MG: 10 INJECTION, EMULSION INTRAVENOUS at 22:05

## 2025-06-28 RX ADMIN — SODIUM CHLORIDE: 0.9 INJECTION, SOLUTION INTRAVENOUS at 22:01

## 2025-06-28 RX ADMIN — FENTANYL CITRATE 50 MCG: 50 INJECTION INTRAMUSCULAR; INTRAVENOUS at 21:41

## 2025-06-28 RX ADMIN — MIDAZOLAM 2 MG: 1 INJECTION INTRAMUSCULAR; INTRAVENOUS at 22:01

## 2025-06-28 RX ADMIN — DEXAMETHASONE SODIUM PHOSPHATE 10 MG: 10 INJECTION, SOLUTION INTRAMUSCULAR; INTRAVENOUS at 22:10

## 2025-06-28 RX ADMIN — DEXMEDETOMIDINE HYDROCHLORIDE 8 MCG: 100 INJECTION, SOLUTION INTRAVENOUS at 22:12

## 2025-06-28 RX ADMIN — DEXMEDETOMIDINE HYDROCHLORIDE 8 MCG: 100 INJECTION, SOLUTION INTRAVENOUS at 23:41

## 2025-06-28 RX ADMIN — FENTANYL CITRATE 50 MCG: 50 INJECTION INTRAMUSCULAR; INTRAVENOUS at 22:10

## 2025-06-28 RX ADMIN — ROCURONIUM BROMIDE 10 MG: 10 INJECTION, SOLUTION INTRAVENOUS at 22:52

## 2025-06-28 RX ADMIN — ONDANSETRON 4 MG: 2 INJECTION INTRAMUSCULAR; INTRAVENOUS at 22:10

## 2025-06-28 RX ADMIN — TETANUS TOXOID, REDUCED DIPHTHERIA TOXOID AND ACELLULAR PERTUSSIS VACCINE, ADSORBED 0.5 ML: 5; 2.5; 8; 8; 2.5 SUSPENSION INTRAMUSCULAR at 21:39

## 2025-06-28 RX ADMIN — LIDOCAINE HYDROCHLORIDE 50 MG: 10 INJECTION, SOLUTION EPIDURAL; INFILTRATION; INTRACAUDAL; PERINEURAL at 22:05

## 2025-06-29 ENCOUNTER — APPOINTMENT (INPATIENT)
Dept: RADIOLOGY | Facility: HOSPITAL | Age: 25
DRG: 580 | End: 2025-06-29
Payer: COMMERCIAL

## 2025-06-29 PROBLEM — S51.812A LACERATION OF LEFT FOREARM: Status: ACTIVE | Noted: 2025-06-29

## 2025-06-29 PROBLEM — S51.811A LACERATION OF RIGHT FOREARM: Status: ACTIVE | Noted: 2025-06-29

## 2025-06-29 LAB
ANION GAP SERPL CALCULATED.3IONS-SCNC: 14 MMOL/L (ref 4–13)
ANISOCYTOSIS BLD QL SMEAR: PRESENT
BASOPHILS # BLD MANUAL: 0 THOUSAND/UL (ref 0–0.1)
BASOPHILS NFR MAR MANUAL: 0 % (ref 0–1)
BUN SERPL-MCNC: 12 MG/DL (ref 5–25)
CALCIUM SERPL-MCNC: 8.4 MG/DL (ref 8.4–10.2)
CHLORIDE SERPL-SCNC: 102 MMOL/L (ref 96–108)
CO2 SERPL-SCNC: 21 MMOL/L (ref 21–32)
CREAT SERPL-MCNC: 0.99 MG/DL (ref 0.6–1.3)
EOSINOPHIL # BLD MANUAL: 0 THOUSAND/UL (ref 0–0.4)
EOSINOPHIL NFR BLD MANUAL: 0 % (ref 0–6)
ERYTHROCYTE [DISTWIDTH] IN BLOOD BY AUTOMATED COUNT: 11.9 % (ref 11.6–15.1)
GFR SERPL CREATININE-BSD FRML MDRD: 104 ML/MIN/1.73SQ M
GLUCOSE SERPL-MCNC: 105 MG/DL (ref 65–140)
HCT VFR BLD AUTO: 38.9 % (ref 36.5–49.3)
HGB BLD-MCNC: 13.3 G/DL (ref 12–17)
LYMPHOCYTES # BLD AUTO: 0.8 THOUSAND/UL (ref 0.6–4.47)
LYMPHOCYTES # BLD AUTO: 3 % (ref 14–44)
MCH RBC QN AUTO: 30.4 PG (ref 26.8–34.3)
MCHC RBC AUTO-ENTMCNC: 34.2 G/DL (ref 31.4–37.4)
MCV RBC AUTO: 89 FL (ref 82–98)
MONOCYTES # BLD AUTO: 0.32 THOUSAND/UL (ref 0–1.22)
MONOCYTES NFR BLD: 2 % (ref 4–12)
NEUTROPHILS # BLD MANUAL: 14.85 THOUSAND/UL (ref 1.85–7.62)
NEUTS BAND NFR BLD MANUAL: 2 % (ref 0–8)
NEUTS SEG NFR BLD AUTO: 91 % (ref 43–75)
PLATELET # BLD AUTO: 236 THOUSANDS/UL (ref 149–390)
PLATELET BLD QL SMEAR: ADEQUATE
PMV BLD AUTO: 9.8 FL (ref 8.9–12.7)
POIKILOCYTOSIS BLD QL SMEAR: PRESENT
POTASSIUM SERPL-SCNC: 3.9 MMOL/L (ref 3.5–5.3)
RBC # BLD AUTO: 4.38 MILLION/UL (ref 3.88–5.62)
RBC MORPH BLD: PRESENT
SODIUM SERPL-SCNC: 137 MMOL/L (ref 135–147)
VARIANT LYMPHS # BLD AUTO: 2 %
WBC # BLD AUTO: 15.97 THOUSAND/UL (ref 4.31–10.16)

## 2025-06-29 PROCEDURE — 85007 BL SMEAR W/DIFF WBC COUNT: CPT

## 2025-06-29 PROCEDURE — 99024 POSTOP FOLLOW-UP VISIT: CPT | Performed by: SURGERY

## 2025-06-29 PROCEDURE — NC001 PR NO CHARGE: Performed by: STUDENT IN AN ORGANIZED HEALTH CARE EDUCATION/TRAINING PROGRAM

## 2025-06-29 PROCEDURE — 80048 BASIC METABOLIC PNL TOTAL CA: CPT

## 2025-06-29 PROCEDURE — 85027 COMPLETE CBC AUTOMATED: CPT

## 2025-06-29 PROCEDURE — 73090 X-RAY EXAM OF FOREARM: CPT

## 2025-06-29 RX ORDER — ONDANSETRON 2 MG/ML
4 INJECTION INTRAMUSCULAR; INTRAVENOUS ONCE AS NEEDED
Status: COMPLETED | OUTPATIENT
Start: 2025-06-29 | End: 2025-06-29

## 2025-06-29 RX ORDER — HYDROMORPHONE HCL/PF 1 MG/ML
0.5 SYRINGE (ML) INJECTION
Status: DISCONTINUED | OUTPATIENT
Start: 2025-06-29 | End: 2025-06-29 | Stop reason: HOSPADM

## 2025-06-29 RX ORDER — PROMETHAZINE HYDROCHLORIDE 25 MG/ML
12.5 INJECTION, SOLUTION INTRAMUSCULAR; INTRAVENOUS ONCE AS NEEDED
Status: DISCONTINUED | OUTPATIENT
Start: 2025-06-29 | End: 2025-06-29 | Stop reason: HOSPADM

## 2025-06-29 RX ORDER — ACETAMINOPHEN 325 MG/1
975 TABLET ORAL EVERY 8 HOURS SCHEDULED
Status: DISCONTINUED | OUTPATIENT
Start: 2025-06-29 | End: 2025-06-30

## 2025-06-29 RX ORDER — SODIUM CHLORIDE, SODIUM LACTATE, POTASSIUM CHLORIDE, CALCIUM CHLORIDE 600; 310; 30; 20 MG/100ML; MG/100ML; MG/100ML; MG/100ML
125 INJECTION, SOLUTION INTRAVENOUS CONTINUOUS
Status: DISCONTINUED | OUTPATIENT
Start: 2025-06-29 | End: 2025-06-30

## 2025-06-29 RX ORDER — OXYCODONE HYDROCHLORIDE 5 MG/1
5 TABLET ORAL EVERY 4 HOURS PRN
Status: DISCONTINUED | OUTPATIENT
Start: 2025-06-29 | End: 2025-07-01 | Stop reason: HOSPADM

## 2025-06-29 RX ORDER — FENTANYL CITRATE/PF 50 MCG/ML
50 SYRINGE (ML) INJECTION
Status: DISCONTINUED | OUTPATIENT
Start: 2025-06-29 | End: 2025-06-29 | Stop reason: HOSPADM

## 2025-06-29 RX ORDER — LORAZEPAM 2 MG/ML
0.5 INJECTION INTRAMUSCULAR
Status: DISCONTINUED | OUTPATIENT
Start: 2025-06-29 | End: 2025-06-29 | Stop reason: HOSPADM

## 2025-06-29 RX ADMIN — FENTANYL CITRATE 50 MCG: 50 INJECTION INTRAMUSCULAR; INTRAVENOUS at 00:36

## 2025-06-29 RX ADMIN — OXYCODONE HYDROCHLORIDE 5 MG: 5 TABLET ORAL at 01:38

## 2025-06-29 RX ADMIN — ACETAMINOPHEN 975 MG: 325 TABLET, FILM COATED ORAL at 22:49

## 2025-06-29 RX ADMIN — OXYCODONE HYDROCHLORIDE 5 MG: 5 TABLET ORAL at 04:51

## 2025-06-29 RX ADMIN — HYDROMORPHONE HYDROCHLORIDE 0.5 MG: 1 INJECTION, SOLUTION INTRAMUSCULAR; INTRAVENOUS; SUBCUTANEOUS at 08:10

## 2025-06-29 RX ADMIN — HYDROMORPHONE HYDROCHLORIDE 0.5 MG: 1 INJECTION, SOLUTION INTRAMUSCULAR; INTRAVENOUS; SUBCUTANEOUS at 00:49

## 2025-06-29 RX ADMIN — FENTANYL CITRATE 50 MCG: 50 INJECTION INTRAMUSCULAR; INTRAVENOUS at 00:30

## 2025-06-29 RX ADMIN — OXYCODONE HYDROCHLORIDE 5 MG: 5 TABLET ORAL at 20:36

## 2025-06-29 RX ADMIN — SODIUM CHLORIDE, SODIUM LACTATE, POTASSIUM CHLORIDE, AND CALCIUM CHLORIDE 125 ML/HR: .6; .31; .03; .02 INJECTION, SOLUTION INTRAVENOUS at 01:32

## 2025-06-29 RX ADMIN — HYDROMORPHONE HYDROCHLORIDE 0.5 MG: 1 INJECTION, SOLUTION INTRAMUSCULAR; INTRAVENOUS; SUBCUTANEOUS at 18:28

## 2025-06-29 RX ADMIN — ACETAMINOPHEN 975 MG: 325 TABLET, FILM COATED ORAL at 05:08

## 2025-06-29 RX ADMIN — OXYCODONE HYDROCHLORIDE 5 MG: 5 TABLET ORAL at 09:54

## 2025-06-29 RX ADMIN — ONDANSETRON 4 MG: 2 INJECTION, SOLUTION INTRAMUSCULAR; INTRAVENOUS at 00:37

## 2025-06-29 RX ADMIN — HYDROMORPHONE HYDROCHLORIDE 0.5 MG: 1 INJECTION, SOLUTION INTRAMUSCULAR; INTRAVENOUS; SUBCUTANEOUS at 23:57

## 2025-06-29 RX ADMIN — HYDROMORPHONE HYDROCHLORIDE 0.5 MG: 1 INJECTION, SOLUTION INTRAMUSCULAR; INTRAVENOUS; SUBCUTANEOUS at 13:20

## 2025-06-29 RX ADMIN — OXYCODONE HYDROCHLORIDE 5 MG: 5 TABLET ORAL at 15:13

## 2025-06-29 NOTE — ASSESSMENT & PLAN NOTE
24M with laceration of left volar forearm. Currently neurovascularly intact. Wound vac in place, managed by trauma surgery.     Plan:  Continue to monitor neurovascular exam   Pain control   Rest of care as per primary

## 2025-06-29 NOTE — ASSESSMENT & PLAN NOTE
- Bilateral upper extremity washout, primary closure of right laceration, partial closure of left laceration with wound vac placement 6/28  - Diet as tolerated  - Remove right arm dressing 6/30  - Or 6/30 for left arm vac change and possible closure  - Multimodal analgesia  - Remainder of care per ICU team

## 2025-06-29 NOTE — OP NOTE
OPERATIVE REPORT  PATIENT NAME: Iban Gao    :  2000  MRN: [id number]  Pt Location:  OR ROOM 10    SURGERY DATE: 2025    Surgeons and Role:     * Carlton Moncada MD - Primary     * Marck Bonner MD - Assisting    Preop Diagnosis:  Bilateral forearm lacerations    Postop Diagnosis:  Bilateral forearm lacerations      Procedure(s):  Bilateral - DEBRIDEMENT UPPER EXTREMITY (WASH OUT) bilateral. primary closure of right arm wound, partial closure of left arm wound  Left - APPLICATION VAC DRESSING left forearm    Specimen(s):  * No specimens in log *    Estimated Blood Loss:   Minimal    Drains:  * No LDAs found *    Anesthesia Type:   General    Operative Indications:  Self inflicted trauma, bilateral forearm lacerations    Operative Findings:  Bilateral forearm lacerations. Bilateral radial and ulnar pulses palpable at the start and conclusion of the case. Right forearm wound closed. Left forearm wound partially closed with vac placed due to bulging of muscle. One black sponge in wound.  Infection was present at time of surgery as evidenced by infected fluid/tissue.       Complications:   None    Procedure and Technique:  The patient was brought to the operating room and placed supine on the table and prepped and draped in usual sterile manner. Timeout was performed and all elements of timeout reviewed and confirmed. Attention was first paid to the left forearm which had active bleeding present.  artery suture ligated with 2-0 Vicryl. Remainder of hemorrhage controlled with electrocautery. No tendon injury identified. No large vessel injury identified. Wound copiously irrigated with saline. Surgicel placed on muscle bed. Left forearm wound measured 11cm x 7 cm x 1 cm at the start of the procedure. Approximately 6cm of the incision closed with interrupted vertical mattress sutures using a 2-0 Nylon suture. There was significant muscle edema noted from the trauma which  necessitated only partial closure. A black sponge was placed in the remaining open wound and secured with vac drape and placed to -125 mmHg continuous suction. Attention was then turned to the right arm. Hemostasis was obtained with electrocautery. The wound was irrigated copiously with normal saline. A piece of surgicel was placed in to the wound. The wound was then closed with interrupted vertical mattress sutures using 2-0 Nylon. The right forearm wound measured 14.5 cm x 6 cm x 1 cm prior to closure. The closed wound was then covered with antibiotics ointment, adaptic, and wrapped with kerlix and and ACE bandage. Bilateral radial and ulnar pulses palpable at the start and conclusion of the case. All counts correct x2 at the conclusion of the case.         Patient Disposition:  PACU  and extubated and stable         SIGNATURE: Marck Bonner MD  DATE: June 29, 2025  TIME: 1:01 AM

## 2025-06-29 NOTE — PROGRESS NOTES
Progress Note - Surgery-General   Name: Iban Gao 24 y.o. male I MRN: [id number]  Unit/Bed#: [facility] 633-01 I Date of Admission: 6/28/2025   Date of Service: 6/29/2025 I Hospital Day: 1    Assessment & Plan  Suicide and self-inflicted injury (HCC)  Laceration of right forearm  Laceration of left forearm  - Bilateral upper extremity washout, primary closure of right laceration, partial closure of left laceration with wound vac placement 6/28  - Diet as tolerated  - Remove right arm dressing 6/30  - Or 6/30 for left arm vac change and possible closure  - Multimodal analgesia  - Remainder of care per ICU team    Surgery-General service will follow.    Subjective   No acute events overnight. Afebrile, hemodynamically stable. Tolerating diet. No nausea, or vomiting, fevers or chills.       Objective :  Temp:  [98.1 °F (36.7 °C)-100.1 °F (37.8 °C)] 98.3 °F (36.8 °C)  HR:  [] 80  BP: ()/(43-91) 119/64  Resp:  [16-22] 16  SpO2:  [93 %-99 %] 93 %  O2 Device: None (Room air)    I/O         06/27 0701  06/28 0700 06/28 0701  06/29 0700 06/29 0701  06/30 0700    I.V. (mL/kg)  1500 (18.5)     Total Intake(mL/kg)  1500 (18.5)     Net  +1500                    Physical Exam:  General: No acute distress, alert and oriented  CV: Well perfused, regular rate  Lungs: Normal work of breathing, no increased respiratory effort  Abdomen: Soft, non-tender, non-distended  Extremities: No edema, clubbing or cyanosis. Bilateral upper extremities neurovascular intact, palpable radial and ulnar pulses.  Wound vac in place  Skin: Warm, dry      Lab Results: I have reviewed the following results:  Recent Labs     06/29/25  0457   WBC 15.97*   HGB 13.3   HCT 38.9      BANDSPCT 2   SODIUM 137   K 3.9      CO2 21   BUN 12   CREATININE 0.99   GLUC 105       Imaging Results Review: No pertinent imaging studies reviewed.  Other Study Results Review: No additional pertinent studies reviewed.    VTE Pharmacologic Prophylaxis: VTE  covered by:    None     VTE Mechanical Prophylaxis: sequential compression device

## 2025-06-29 NOTE — CHAPLAIN
responded to trauma. No family present. Spoke to pt and he asked me to contact his father. Confirmed with medical team that this was ok to do. Father would appreciate medical updates. Robin [phone]. Sticky note added to chart.

## 2025-06-29 NOTE — ANESTHESIA POSTPROCEDURE EVALUATION
Post-Op Assessment Note    CV Status:  Stable  Pain Score: 0    Pain management: adequate       Mental Status:  Sleepy   Hydration Status:  Euvolemic   PONV Controlled:  Controlled   Airway Patency:  Patent     Post Op Vitals Reviewed: Yes    No anethesia notable event occurred.    Staff: CRNA   Comments: vss sv nonobstructed uneventful          Last Filed PACU Vitals:  Vitals Value Taken Time   Temp     Pulse 65    BP 98/43    Resp 18    SpO2 98

## 2025-06-29 NOTE — NURSING NOTE
Patient bypassed holding area and was transferred directly to OR. Anesthesiologist and this nurse transported to OR.
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
## 2025-06-29 NOTE — ASSESSMENT & PLAN NOTE
Bilateral Forearm lacerations  8-10cm on each arm w/o appreciated Nerve damage  Tendons visible but not damaged   Plan   OR washout and closure 6/28    -R Arm Closed fully    -L Arm Wound vac   Neurovascular checks   Tdap updated   Multimodal pain control   1:1 observation   IV Abx   Psych consultation    -inpatient behavioral health after medically cleared for DC

## 2025-06-29 NOTE — CASE MANAGEMENT
Case Management Assessment & Discharge Planning Note    Patient name Iban Gao  [facility] MRN [id number]  : 2000 Date 2025       Current Admission Date: 2025  Current Admission Diagnosis:Suicide and self-inflicted injury (HCC)   Patient Active Problem List    Diagnosis Date Noted    Laceration of right forearm 2025    Laceration of left forearm 2025    Suicide and self-inflicted injury (HCC) 2025      LOS (days): 1  Geometric Mean LOS (GMLOS) (days):   Days to GMLOS:     OBJECTIVE:    Risk of Unplanned Readmission Score: 5.5       Current admission status: Inpatient     Preferred Pharmacy: No Pharmacies Listed  Primary Care Provider: Blaine Cotter MD    Primary Insurance: BLUE CROSS  Secondary Insurance:     ASSESSMENT:  Active Health Care Proxies    There are no active Health Care Proxies on file.       Advance Directives  Does patient have a Health Care POA?: No  Was patient offered paperwork?: No (Not appropriate at this time.)  Does patient currently have a Health Care decision maker?: Yes, please see Health Care Proxy section  Does patient have Advance Directives?: No  Was patient offered paperwork?: No  Primary Contact: Segundo Rasmussen       Readmission Root Cause  30 Day Readmission: No    Patient Information  Admitted from:: Home  Mental Status: Alert  During Assessment patient was accompanied by: Parent, Other-Comment (father was outside the room with pt brother, 1:1 in room.)  Assessment information provided by:: Patient  Primary Caregiver: Family  Caregiver's Name:: Segundo Gao  Caregiver's Relationship to Patient:: Family Member  Caregiver's Telephone Number:: [phone]  Support Systems: Parent  County of Residence: [facility]  What city do you live in?: [place]  Home entry access options. Select all that apply.: Stairs  Number of steps to enter home.: 5  Do the steps have railings?: Yes  Type of Current Residence: [address] [place] [address] home  Upon  entering residence, is there a bedroom on the main floor (no further steps)?: No  A bedroom is located on the following floor levels of residence (select all that apply):: 2nd Floor  Upon entering residence, is there a bathroom on the main floor (no further steps)?: No  Indicate which floors of current residence have a bathroom (select all the apply):: 2nd Floor  Number of steps to 2nd floor from main floor: One Flight  Living Arrangements: Lives w/ Parent(s), Lives w/ Extended Family  Is patient a ?: No    Activities of Daily Living Prior to Admission  Functional Status: Independent  Completes ADLs independently?: Yes  Ambulates independently?: Yes  Does patient use assisted devices?: No  Does patient currently own DME?: No  Does patient have a history of Outpatient Therapy (PT/OT)?: No  Does the patient have a history of Short-Term Rehab?: No  Does patient have a history of HHC?: No  Does patient currently have HHC?: No       Patient Information Continued  Income Source: Employed  Does patient have prescription coverage?: Yes  Can the patient afford their medications and any related supplies (such as glucometers or test strips)?: N/A  Does patient receive dialysis treatments?: No  Does patient have a history of substance abuse?: Yes  Historical substance use preference: Alcohol/ETOH  History of Withdrawal Symptoms: Denies past symptoms  Is patient currently in treatment for substance abuse?: No. Patient declined treatment information. (pt states social drinker on occasion.)  Does patient have a history of Mental Health Diagnosis?: Yes  Is patient receiving treatment for mental health?:  (Per chart review, pt stopped taking his medication when insurance ran out at 24.)       Means of Transportation  Means of Transport to [facility]:: Family transport  DISCHARGE DETAILS:     CM met with pt at bedside, 1:1 was present in the room.  Introduced myself and explained my role.  Pt states he resides with his dad Robin and  he is main contact, not pt Mom. Robin can be reached at [phone].  Facesheet updated with dad as main contact.  Pt with previous psych history per notes.  Positive for THC, pt states drinks on occasion.  Psych pending.      Pt now s/p surgery with wound vac applied to one forearm.  CM following for medical clearance and psych recommendations.
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
## 2025-06-29 NOTE — ANESTHESIA PREPROCEDURE EVALUATION
Procedure:  DEBRIDEMENT UPPER EXTREMITY (WASH OUT) bilateral with closure (Bilateral: Arm)    Relevant Problems   No relevant active problems        Physical Exam    Airway     Mallampati score: II    Neck ROM: full      Cardiovascular      Dental       Pulmonary      Neurological      Other Findings        Anesthesia Plan  ASA Score- 2 Emergent    Anesthesia Type- general with ASA Monitors.         Additional Monitors:     Airway Plan: Oral ETT.    Comment: I, Dr. Romo, the attending physician, have personally seen and evaluated the patient prior to anesthetic care.  I have reviewed the pre-anesthetic record, and other medical records if appropriate to the anesthetic care.  If a CRNA is involved in the case, I have reviewed the CRNA assessment, if present, and agree.  The patient is in a suitable condition to proceed with my formulated anesthetic plan.  .       Plan Factors-    Chart reviewed.                      Induction-     Postoperative Plan-         Informed Consent- Anesthetic plan and risks discussed with patient.  I personally reviewed this patient with the CRNA. Discussed and agreed on the Anesthesia Plan with the CRNA..      NPO Status:  No vitals data found for the desired time range.

## 2025-06-29 NOTE — PROGRESS NOTES
Progress Note/Post Op Check - Trauma   Name: Iban Gao 24 y.o. male I MRN: [id number]  Unit/Bed#: [facility] 633-01 I Date of Admission: 6/28/2025   Date of Service: 6/29/2025 I Hospital Day: 1    Assessment & Plan  Suicide and self-inflicted injury (HCC)  Bilateral Forearm lacerations  8-10cm on each arm w/o appreciated Nerve damage  Tendons visible but not damaged   Plan   OR washout and closure 6/28    -R Arm Closed fully    -L Arm Wound vac   Neurovascular checks   Tdap updated   Multimodal pain control   1:1 observation   IV Abx   Psych consultation    -inpatient behavioral health after medically cleared for DC    VTE Prophylaxis: Reason for no pharmacologic prophylaxis       Disposition: MS Downgrade Medications reviewed. Continue current medications at prescribed doses.        24 Hour Events : Patient brought to OR for washout and closure. Right Arm fully closed. Left Arm closed partial with Wound vac placed   Subjective : patients pain controlled.     Objective :  Temp:  [98.4 °F (36.9 °C)-100.1 °F (37.8 °C)] 98.4 °F (36.9 °C)  HR:  [] 83  BP: ()/(43-91) 120/69  Resp:  [16-22] 18  SpO2:  [96 %-99 %] 96 %  O2 Device: Simple mask    I/O         06/27 0701  06/28 0700 06/28 0701  06/29 0700    I.V. (mL/kg)  1500 (18.5)    Total Intake(mL/kg)  1500 (18.5)    Net  +1500                  Physical Exam  Vitals and nursing note reviewed.   Constitutional:       General: He is not in acute distress.     Appearance: He is well-developed.   HENT:      Head: Normocephalic and atraumatic.     Eyes:      Conjunctiva/sclera: Conjunctivae normal.       Cardiovascular:      Rate and Rhythm: Normal rate and regular rhythm.      Heart sounds: No murmur heard.  Pulmonary:      Effort: Pulmonary effort is normal. No respiratory distress.      Breath sounds: Normal breath sounds.   Abdominal:      Palpations: Abdomen is soft.      Tenderness: There is no abdominal tenderness.     Musculoskeletal:         General: No  "swelling.        Arms:       Cervical back: Neck supple.     Skin:     General: Skin is warm and dry.      Capillary Refill: Capillary refill takes less than 2 seconds.     Neurological:      Mental Status: He is alert.     Psychiatric:         Mood and Affect: Mood normal.                 Lab Results: I have reviewed the following results:  No results for input(s): \"WBC\", \"HGB\", \"HCT\", \"PLT\", \"BANDSPCT\", \"SODIUM\", \"K\", \"CL\", \"CO2\", \"BUN\", \"CREATININE\", \"GLUC\", \"CAIONIZED\", \"MG\", \"PHOS\", \"AST\", \"ALT\", \"ALB\", \"TBILI\", \"DBILI\", \"ALKPHOS\", \"PTT\", \"INR\", \"HSTNI0\", \"HSTNI2\", \"BNP\", \"LACTICACID\" in the last 72 hours.    Imaging Results Review: No pertinent imaging studies reviewed.  Other Study Results Review: No additional pertinent studies reviewed.  "
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
## 2025-06-29 NOTE — ASSESSMENT & PLAN NOTE
24M with laceration of right volar forearm. Currently neurovascularly intact.    Plan:  Continue to monitor neurovascular exam  Pain control  Rest of care as per primary

## 2025-06-29 NOTE — ASSESSMENT & PLAN NOTE
Bilateral Forearm lacerations  8-10cm on each arm w/o appreciated Nerve damage  Tendons visible but not damaged   Plan   OR washout and closure   Neurovascular checks   Tdap updated   Multimodal pain control   1:1 observation   IV Abx   Psych consultation    -inpatient behavioral health after medically cleared for DC

## 2025-06-30 ENCOUNTER — ANESTHESIA (INPATIENT)
Dept: PERIOP | Facility: HOSPITAL | Age: 25
DRG: 580 | End: 2025-06-30
Payer: COMMERCIAL

## 2025-06-30 ENCOUNTER — ANESTHESIA EVENT (INPATIENT)
Dept: PERIOP | Facility: HOSPITAL | Age: 25
DRG: 580 | End: 2025-06-30
Payer: COMMERCIAL

## 2025-06-30 PROBLEM — F29 PSYCHOSIS (HCC): Status: ACTIVE | Noted: 2025-06-30

## 2025-06-30 LAB
ANION GAP SERPL CALCULATED.3IONS-SCNC: 9 MMOL/L (ref 4–13)
ATRIAL RATE: 79 BPM
BASE EXCESS BLDA CALC-SCNC: -2 MMOL/L (ref -2–3)
BASOPHILS # BLD AUTO: 0.02 THOUSANDS/ÂΜL (ref 0–0.1)
BASOPHILS NFR BLD AUTO: 0 % (ref 0–1)
BUN SERPL-MCNC: 9 MG/DL (ref 5–25)
CA-I BLD-SCNC: 1.08 MMOL/L (ref 1.12–1.32)
CALCIUM SERPL-MCNC: 8.6 MG/DL (ref 8.4–10.2)
CHLORIDE SERPL-SCNC: 104 MMOL/L (ref 96–108)
CO2 SERPL-SCNC: 25 MMOL/L (ref 21–32)
CREAT SERPL-MCNC: 0.9 MG/DL (ref 0.6–1.3)
EOSINOPHIL # BLD AUTO: 0.1 THOUSAND/ÂΜL (ref 0–0.61)
EOSINOPHIL NFR BLD AUTO: 1 % (ref 0–6)
ERYTHROCYTE [DISTWIDTH] IN BLOOD BY AUTOMATED COUNT: 12 % (ref 11.6–15.1)
GFR SERPL CREATININE-BSD FRML MDRD: 117 ML/MIN/1.73SQ M
GLUCOSE SERPL-MCNC: 90 MG/DL (ref 65–140)
HCO3 BLDA-SCNC: 20 MMOL/L (ref 24–30)
HCT VFR BLD AUTO: 38.9 % (ref 36.5–49.3)
HCT VFR BLD CALC: 47 % (ref 36.5–49.3)
HGB BLD-MCNC: 13.3 G/DL (ref 12–17)
HGB BLDA-MCNC: 16 G/DL (ref 12–17)
IMM GRANULOCYTES # BLD AUTO: 0.03 THOUSAND/UL (ref 0–0.2)
IMM GRANULOCYTES NFR BLD AUTO: 0 % (ref 0–2)
LYMPHOCYTES # BLD AUTO: 1.22 THOUSANDS/ÂΜL (ref 0.6–4.47)
LYMPHOCYTES NFR BLD AUTO: 14 % (ref 14–44)
MCH RBC QN AUTO: 30.2 PG (ref 26.8–34.3)
MCHC RBC AUTO-ENTMCNC: 34.2 G/DL (ref 31.4–37.4)
MCV RBC AUTO: 88 FL (ref 82–98)
MONOCYTES # BLD AUTO: 0.52 THOUSAND/ÂΜL (ref 0.17–1.22)
MONOCYTES NFR BLD AUTO: 6 % (ref 4–12)
NEUTROPHILS # BLD AUTO: 6.96 THOUSANDS/ÂΜL (ref 1.85–7.62)
NEUTS SEG NFR BLD AUTO: 79 % (ref 43–75)
NRBC BLD AUTO-RTO: 0 /100 WBCS
P AXIS: 70 DEGREES
PCO2 BLD: 21 MMOL/L (ref 21–32)
PCO2 BLD: 26.9 MM HG (ref 42–50)
PH BLD: 7.48 [PH] (ref 7.3–7.4)
PLATELET # BLD AUTO: 228 THOUSANDS/UL (ref 149–390)
PMV BLD AUTO: 9.8 FL (ref 8.9–12.7)
PO2 BLD: 31 MM HG (ref 35–45)
POTASSIUM BLD-SCNC: 3.2 MMOL/L (ref 3.5–5.3)
POTASSIUM SERPL-SCNC: 3.7 MMOL/L (ref 3.5–5.3)
PR INTERVAL: 158 MS
QRS AXIS: 83 DEGREES
QRSD INTERVAL: 92 MS
QT INTERVAL: 382 MS
QTC INTERVAL: 438 MS
RBC # BLD AUTO: 4.41 MILLION/UL (ref 3.88–5.62)
SAO2 % BLD FROM PO2: 66 % (ref 60–85)
SODIUM BLD-SCNC: 137 MMOL/L (ref 136–145)
SODIUM SERPL-SCNC: 138 MMOL/L (ref 135–147)
SPECIMEN SOURCE: ABNORMAL
T WAVE AXIS: 67 DEGREES
VENTRICULAR RATE: 79 BPM
WBC # BLD AUTO: 8.85 THOUSAND/UL (ref 4.31–10.16)

## 2025-06-30 PROCEDURE — 12002 RPR S/N/AX/GEN/TRNK2.6-7.5CM: CPT | Performed by: SURGERY

## 2025-06-30 PROCEDURE — 93005 ELECTROCARDIOGRAM TRACING: CPT

## 2025-06-30 PROCEDURE — 85025 COMPLETE CBC W/AUTO DIFF WBC: CPT

## 2025-06-30 PROCEDURE — 93010 ELECTROCARDIOGRAM REPORT: CPT | Performed by: INTERNAL MEDICINE

## 2025-06-30 PROCEDURE — 0JQH0ZZ REPAIR LEFT LOWER ARM SUBCUTANEOUS TISSUE AND FASCIA, OPEN APPROACH: ICD-10-PCS | Performed by: SURGERY

## 2025-06-30 PROCEDURE — 80048 BASIC METABOLIC PNL TOTAL CA: CPT

## 2025-06-30 PROCEDURE — NC001 PR NO CHARGE: Performed by: STUDENT IN AN ORGANIZED HEALTH CARE EDUCATION/TRAINING PROGRAM

## 2025-06-30 PROCEDURE — 99024 POSTOP FOLLOW-UP VISIT: CPT | Performed by: SURGERY

## 2025-06-30 PROCEDURE — 99255 IP/OBS CONSLTJ NEW/EST HI 80: CPT | Performed by: PSYCHIATRY & NEUROLOGY

## 2025-06-30 RX ORDER — MAGNESIUM HYDROXIDE 1200 MG/15ML
LIQUID ORAL AS NEEDED
Status: DISCONTINUED | OUTPATIENT
Start: 2025-06-30 | End: 2025-06-30 | Stop reason: HOSPADM

## 2025-06-30 RX ORDER — HYDROXYZINE HYDROCHLORIDE 50 MG/1
100 TABLET, FILM COATED ORAL EVERY 6 HOURS PRN
Status: DISCONTINUED | OUTPATIENT
Start: 2025-06-30 | End: 2025-07-01 | Stop reason: HOSPADM

## 2025-06-30 RX ORDER — SODIUM CHLORIDE, SODIUM LACTATE, POTASSIUM CHLORIDE, CALCIUM CHLORIDE 600; 310; 30; 20 MG/100ML; MG/100ML; MG/100ML; MG/100ML
INJECTION, SOLUTION INTRAVENOUS CONTINUOUS PRN
Status: DISCONTINUED | OUTPATIENT
Start: 2025-06-30 | End: 2025-06-30

## 2025-06-30 RX ORDER — ACETAMINOPHEN 325 MG/1
975 TABLET ORAL EVERY 8 HOURS SCHEDULED
Status: DISCONTINUED | OUTPATIENT
Start: 2025-06-30 | End: 2025-07-01

## 2025-06-30 RX ORDER — HALOPERIDOL 5 MG/ML
5 INJECTION INTRAMUSCULAR ONCE
Status: DISCONTINUED | OUTPATIENT
Start: 2025-06-30 | End: 2025-06-30

## 2025-06-30 RX ORDER — HALOPERIDOL 5 MG/1
5 TABLET ORAL
Status: DISCONTINUED | OUTPATIENT
Start: 2025-06-30 | End: 2025-07-01 | Stop reason: HOSPADM

## 2025-06-30 RX ORDER — ONDANSETRON 2 MG/ML
INJECTION INTRAMUSCULAR; INTRAVENOUS AS NEEDED
Status: DISCONTINUED | OUTPATIENT
Start: 2025-06-30 | End: 2025-06-30

## 2025-06-30 RX ORDER — LIDOCAINE HYDROCHLORIDE 10 MG/ML
INJECTION, SOLUTION EPIDURAL; INFILTRATION; INTRACAUDAL; PERINEURAL AS NEEDED
Status: DISCONTINUED | OUTPATIENT
Start: 2025-06-30 | End: 2025-06-30

## 2025-06-30 RX ORDER — ONDANSETRON 2 MG/ML
4 INJECTION INTRAMUSCULAR; INTRAVENOUS EVERY 4 HOURS PRN
Status: DISCONTINUED | OUTPATIENT
Start: 2025-06-30 | End: 2025-07-01 | Stop reason: HOSPADM

## 2025-06-30 RX ORDER — CEFAZOLIN SODIUM 2 G/50ML
2000 SOLUTION INTRAVENOUS
Status: DISCONTINUED | OUTPATIENT
Start: 2025-06-30 | End: 2025-06-30 | Stop reason: HOSPADM

## 2025-06-30 RX ORDER — LIDOCAINE HYDROCHLORIDE 10 MG/ML
INJECTION, SOLUTION EPIDURAL; INFILTRATION; INTRACAUDAL; PERINEURAL AS NEEDED
Status: DISCONTINUED | OUTPATIENT
Start: 2025-06-30 | End: 2025-06-30 | Stop reason: HOSPADM

## 2025-06-30 RX ORDER — MIDAZOLAM HYDROCHLORIDE 2 MG/2ML
INJECTION, SOLUTION INTRAMUSCULAR; INTRAVENOUS AS NEEDED
Status: DISCONTINUED | OUTPATIENT
Start: 2025-06-30 | End: 2025-06-30

## 2025-06-30 RX ORDER — FENTANYL CITRATE/PF 50 MCG/ML
50 SYRINGE (ML) INJECTION
Refills: 0 | Status: DISCONTINUED | OUTPATIENT
Start: 2025-06-30 | End: 2025-06-30 | Stop reason: HOSPADM

## 2025-06-30 RX ORDER — FENTANYL CITRATE 50 UG/ML
INJECTION, SOLUTION INTRAMUSCULAR; INTRAVENOUS AS NEEDED
Status: DISCONTINUED | OUTPATIENT
Start: 2025-06-30 | End: 2025-06-30

## 2025-06-30 RX ORDER — PROPOFOL 10 MG/ML
INJECTION, EMULSION INTRAVENOUS AS NEEDED
Status: DISCONTINUED | OUTPATIENT
Start: 2025-06-30 | End: 2025-06-30

## 2025-06-30 RX ORDER — QUETIAPINE FUMARATE 25 MG/1
50 TABLET, FILM COATED ORAL
Status: DISCONTINUED | OUTPATIENT
Start: 2025-06-30 | End: 2025-07-01 | Stop reason: HOSPADM

## 2025-06-30 RX ADMIN — FENTANYL CITRATE 50 MCG: 50 INJECTION INTRAMUSCULAR; INTRAVENOUS at 20:55

## 2025-06-30 RX ADMIN — PROPOFOL 300 MG: 10 INJECTION, EMULSION INTRAVENOUS at 20:29

## 2025-06-30 RX ADMIN — FENTANYL CITRATE 50 MCG: 50 INJECTION INTRAMUSCULAR; INTRAVENOUS at 20:29

## 2025-06-30 RX ADMIN — Medication 2.5 MG: at 02:56

## 2025-06-30 RX ADMIN — HYDROXYZINE HYDROCHLORIDE 100 MG: 50 TABLET, FILM COATED ORAL at 19:24

## 2025-06-30 RX ADMIN — SODIUM CHLORIDE, SODIUM LACTATE, POTASSIUM CHLORIDE, AND CALCIUM CHLORIDE: .6; .31; .03; .02 INJECTION, SOLUTION INTRAVENOUS at 20:20

## 2025-06-30 RX ADMIN — ONDANSETRON 4 MG: 2 INJECTION INTRAMUSCULAR; INTRAVENOUS at 20:29

## 2025-06-30 RX ADMIN — ACETAMINOPHEN 975 MG: 325 TABLET, FILM COATED ORAL at 05:15

## 2025-06-30 RX ADMIN — HYDROMORPHONE HYDROCHLORIDE 0.5 MG: 1 INJECTION, SOLUTION INTRAMUSCULAR; INTRAVENOUS; SUBCUTANEOUS at 05:16

## 2025-06-30 RX ADMIN — MIDAZOLAM 2 MG: 1 INJECTION INTRAMUSCULAR; INTRAVENOUS at 20:22

## 2025-06-30 RX ADMIN — QUETIAPINE FUMARATE 50 MG: 25 TABLET ORAL at 22:49

## 2025-06-30 RX ADMIN — LIDOCAINE HYDROCHLORIDE 50 MG: 10 INJECTION, SOLUTION EPIDURAL; INFILTRATION; INTRACAUDAL; PERINEURAL at 20:29

## 2025-06-30 RX ADMIN — ACETAMINOPHEN 975 MG: 325 TABLET, FILM COATED ORAL at 14:43

## 2025-06-30 RX ADMIN — OXYCODONE HYDROCHLORIDE 5 MG: 5 TABLET ORAL at 09:49

## 2025-06-30 RX ADMIN — ACETAMINOPHEN 975 MG: 325 TABLET ORAL at 22:48

## 2025-06-30 RX ADMIN — ONDANSETRON 4 MG: 2 INJECTION, SOLUTION INTRAMUSCULAR; INTRAVENOUS at 22:48

## 2025-06-30 NOTE — PROGRESS NOTES
met with the patient and his grandfather in his room where he was in bed. The patient is fearful of his upcoming surgery as he is afraid they will kill him.  He states he has O blood which is spiritually anointed and there is a spiritual war going on on this planet.  assured him that we would not be trying to kill him that we wanted to safely discharge him from the hospital.   assured him we did not want to take his blood.  He asked how he could be sure they didn't take anything else in surgery other than what they said they were going to do?   assured him that the hospital did not want to take anything from him but to help him heal.  His grandfather agreed with the .  offered support.   remains available.      06/30/25 1500   Clinical Encounter Type   Visited With Patient and family together   Routine Visit Follow-up

## 2025-06-30 NOTE — ASSESSMENT & PLAN NOTE
24M with laceration of left volar forearm. Currently neurovascularly intact. Wound vac in place, managed by trauma surgery with plan to return to OR today for possible wound closure or vac exchange.     Plan:  Continue to monitor neurovascular exam   Pain control   Rest of care as per primary

## 2025-06-30 NOTE — PLAN OF CARE
Problem: PAIN - ADULT  Goal: Verbalizes/displays adequate comfort level or baseline comfort level  Description: Interventions:  - Encourage patient to monitor pain and request assistance  - Assess pain using appropriate pain scale  - Administer analgesics as ordered based on type and severity of pain and evaluate response  - Implement non-pharmacological measures as appropriate and evaluate response  - Consider cultural and social influences on pain and pain management  - Notify physician/advanced practitioner if interventions unsuccessful or patient reports new pain  - Educate patient/family on pain management process including their role and importance of  reporting pain   - Provide non-pharmacologic/complimentary pain relief interventions  Outcome: Progressing     Problem: INFECTION - ADULT  Goal: Absence or prevention of progression during hospitalization  Description: INTERVENTIONS:  - Assess and monitor for signs and symptoms of infection  - Monitor lab/diagnostic results  - Monitor all insertion sites, i.e. indwelling lines, tubes, and drains  - Monitor endotracheal if appropriate and nasal secretions for changes in amount and color  - [place] appropriate cooling/warming therapies per order  - Administer medications as ordered  - Instruct and encourage patient and family to use good hand hygiene technique  - Identify and instruct in appropriate isolation precautions for identified infection/condition  Outcome: Progressing  Goal: Absence of fever/infection during neutropenic period  Description: INTERVENTIONS:  - Monitor WBC  - Perform strict hand hygiene  - Limit to healthy visitors only  - No plants, dried, fresh or silk flowers with ramirez in patient room  Outcome: Progressing     Problem: SAFETY ADULT  Goal: Patient will remain free of falls  Description: INTERVENTIONS:  - Educate patient/family on patient safety including physical limitations  - Instruct patient to call for assistance with activity   -  Consider consulting OT/PT to assist with strengthening/mobility based on AM PAC & JH-HLM score  - Consult OT/PT to assist with strengthening/mobility   - Keep Call bell within reach  - Keep bed low and locked with side rails adjusted as appropriate  - Keep care items and personal belongings within reach  - Initiate and maintain comfort rounds  - Make Fall Risk Sign visible to staff  - Offer Toileting every 2 Hours, in advance of need  - Initiate/Maintain alarm  - Obtain necessary fall risk management equipment  - Apply yellow socks and bracelet for high fall risk patients  - Consider moving patient to room near nurses station  Outcome: Progressing  Goal: Maintain or return to baseline ADL function  Description: INTERVENTIONS:  -  Assess patient's ability to carry out ADLs; assess patient's baseline for ADL function and identify physical deficits which impact ability to perform ADLs (bathing, care of mouth/teeth, toileting, grooming, dressing, etc.)  - Assess/evaluate cause of self-care deficits   - Assess range of motion  - Assess patient's mobility; develop plan if impaired  - Assess patient's need for assistive devices and provide as appropriate  - Encourage maximum independence but intervene and supervise when necessary  - Involve family in performance of ADLs  - Assess for home care needs following discharge   - Consider OT consult to assist with ADL evaluation and planning for discharge  - Provide patient education as appropriate  - Monitor functional capacity and physical performance, use of AM PAC & JH-HLM   - Monitor gait, balance and fatigue with ambulation    Outcome: Progressing  Goal: Maintains/Returns to pre admission functional level  Description: INTERVENTIONS:  - Perform AM-PAC 6 Click Basic Mobility/ Daily Activity assessment daily.  - Set and communicate daily mobility goal to care team and patient/family/caregiver.   - Collaborate with rehabilitation services on mobility goals if consulted  -  Perform Range of Motion 3 times a day.  - Reposition patient every 2 hours.  - Dangle patient 3 times a day  - Stand patient 3 times a day  - Ambulate patient 3 times a day  - Out of bed to chair 3 times a day   - Out of bed for meals 3 times a day  - Out of bed for toileting  - Record patient progress and toleration of activity level   Outcome: Progressing     Problem: DISCHARGE PLANNING  Goal: Discharge to home or other facility with appropriate resources  Description: INTERVENTIONS:  - Identify barriers to discharge w/patient and caregiver  - Arrange for needed discharge resources and transportation as appropriate  - Identify discharge learning needs (meds, wound care, etc.)  - Arrange for interpretive services to assist at discharge as needed  - Refer to Case Management Department for coordinating discharge planning if the patient needs post-hospital services based on physician/advanced practitioner order or complex needs related to functional status, cognitive ability, or social support system  Outcome: Progressing     Problem: Knowledge Deficit  Goal: Patient/family/caregiver demonstrates understanding of disease process, treatment plan, medications, and discharge instructions  Description: Complete learning assessment and assess knowledge base.  Interventions:  - Provide teaching at level of understanding  - Provide teaching via preferred learning methods  Outcome: Progressing

## 2025-06-30 NOTE — PROGRESS NOTES
06/29/25 1610   Clinical Encounter Type   Visited With Patient   Routine Visit Introduction      Pastoral Care Progress Note          Chaplaincy Interventions Utilized:   Empowerment: Encouraged focus on present and Encouraged self-care    Exploration: Explored hope and Identified, evaluated & reinforced appropriate coping strategies    Collaboration: Advocated for patient/family     Relationship Building: Cultivated a relationship of care and support, Listened empathically, Hospitality, and Provided silent and supportive presence    Ritual: Provided prayer    Chaplaincy Outcomes Achieved:  Distress reduced    Spiritual Coping Strategies Utilized:   Connectedness    Additional remarks:  Pt came to ER last night following attempted suicide and was immediately taken to ER. Father called pastoral care office for medical update and I referred him to the appropriate nurse. Visited with pt today and had a light, introductory visit. As a note: pt has a cat named Shant. He smiled when talking about him.

## 2025-06-30 NOTE — ASSESSMENT & PLAN NOTE
Bilateral Forearm lacerations sustained in self-harm event   8-10cm on each arm w/o appreciated Nerve damage  Tendons visible but not damaged   OR washout and closure 6/28    -R Arm Closed fully    -L Arm Wound vac   OR today for vac change and possible closure    Continue frequent neurovascular checks   Multimodal pain control     1:1 observation   Psych consultation    -inpatient behavioral health after medically cleared for DC

## 2025-06-30 NOTE — PLAN OF CARE
Problem: PAIN - ADULT  Goal: Verbalizes/displays adequate comfort level or baseline comfort level  Description: Interventions:  - Encourage patient to monitor pain and request assistance  - Assess pain using appropriate pain scale  - Administer analgesics as ordered based on type and severity of pain and evaluate response  - Implement non-pharmacological measures as appropriate and evaluate response  - Consider cultural and social influences on pain and pain management  - Notify physician/advanced practitioner if interventions unsuccessful or patient reports new pain  - Educate patient/family on pain management process including their role and importance of  reporting pain   - Provide non-pharmacologic/complimentary pain relief interventions  6/30/2025 0747 by Brannon Traylor RN  Outcome: Progressing  6/30/2025 0307 by Brannon Traylor RN  Outcome: Progressing     Problem: INFECTION - ADULT  Goal: Absence or prevention of progression during hospitalization  Description: INTERVENTIONS:  - Assess and monitor for signs and symptoms of infection  - Monitor lab/diagnostic results  - Monitor all insertion sites, i.e. indwelling lines, tubes, and drains  - Monitor endotracheal if appropriate and nasal secretions for changes in amount and color  - [place] appropriate cooling/warming therapies per order  - Administer medications as ordered  - Instruct and encourage patient and family to use good hand hygiene technique  - Identify and instruct in appropriate isolation precautions for identified infection/condition  6/30/2025 0747 by Brannon Traylor RN  Outcome: Progressing  6/30/2025 0307 by Brannon Traylor RN  Outcome: Progressing  Goal: Absence of fever/infection during neutropenic period  Description: INTERVENTIONS:  - Monitor WBC  - Perform strict hand hygiene  - Limit to healthy visitors only  - No plants, dried, fresh or silk flowers with ramirez in patient room  6/30/2025 0747 by Brannon  Ronnie Traylor RN  Outcome: Progressing  6/30/2025 0307 by Brannon Traylor RN  Outcome: Progressing     Problem: SAFETY ADULT  Goal: Patient will remain free of falls  Description: INTERVENTIONS:  - Educate patient/family on patient safety including physical limitations  - Instruct patient to call for assistance with activity   - Consider consulting OT/PT to assist with strengthening/mobility based on AM PAC & JH-HLM score  - Consult OT/PT to assist with strengthening/mobility   - Keep Call bell within reach  - Keep bed low and locked with side rails adjusted as appropriate  - Keep care items and personal belongings within reach  - Initiate and maintain comfort rounds  - Make Fall Risk Sign visible to staff  - Offer Toileting every 2 Hours, in advance of need  - Initiate/Maintain alarm  - Obtain necessary fall risk management equipment  - Apply yellow socks and bracelet for high fall risk patients  - Consider moving patient to room near nurses station  6/30/2025 0747 by Brannon Traylor RN  Outcome: Progressing  6/30/2025 0307 by Brannon Traylor RN  Outcome: Progressing  Goal: Maintain or return to baseline ADL function  Description: INTERVENTIONS:  -  Assess patient's ability to carry out ADLs; assess patient's baseline for ADL function and identify physical deficits which impact ability to perform ADLs (bathing, care of mouth/teeth, toileting, grooming, dressing, etc.)  - Assess/evaluate cause of self-care deficits   - Assess range of motion  - Assess patient's mobility; develop plan if impaired  - Assess patient's need for assistive devices and provide as appropriate  - Encourage maximum independence but intervene and supervise when necessary  - Involve family in performance of ADLs  - Assess for home care needs following discharge   - Consider OT consult to assist with ADL evaluation and planning for discharge  - Provide patient education as appropriate  - Monitor functional capacity and  physical performance, use of AM PAC & JH-HLM   - Monitor gait, balance and fatigue with ambulation    6/30/2025 0747 by Brannon Traylor RN  Outcome: Progressing  6/30/2025 0307 by Brannon Traylor RN  Outcome: Progressing  Goal: Maintains/Returns to pre admission functional level  Description: INTERVENTIONS:  - Perform AM-PAC 6 Click Basic Mobility/ Daily Activity assessment daily.  - Set and communicate daily mobility goal to care team and patient/family/caregiver.   - Collaborate with rehabilitation services on mobility goals if consulted  - Perform Range of Motion 2 times a day.  - Reposition patient every  hours.  - Dangle patient 3 times a day  - Stand patient 3 times a day  - Ambulate patient 3 times a day  - Out of bed to chair 3 times a day   - Out of bed for meals 3 times a day  - Out of bed for toileting  - Record patient progress and toleration of activity level   6/30/2025 0747 by Brannon Traylor RN  Outcome: Progressing  6/30/2025 0307 by Brannon Traylor RN  Outcome: Progressing     Problem: DISCHARGE PLANNING  Goal: Discharge to home or other facility with appropriate resources  Description: INTERVENTIONS:  - Identify barriers to discharge w/patient and caregiver  - Arrange for needed discharge resources and transportation as appropriate  - Identify discharge learning needs (meds, wound care, etc.)  - Arrange for interpretive services to assist at discharge as needed  - Refer to Case Management Department for coordinating discharge planning if the patient needs post-hospital services based on physician/advanced practitioner order or complex needs related to functional status, cognitive ability, or social support system  6/30/2025 0747 by Brannon Traylor RN  Outcome: Progressing  6/30/2025 0307 by Brannon Traylor RN  Outcome: Progressing     Problem: Knowledge Deficit  Goal: Patient/family/caregiver demonstrates understanding of disease process, treatment  plan, medications, and discharge instructions  Description: Complete learning assessment and assess knowledge base.  Interventions:  - Provide teaching at level of understanding  - Provide teaching via preferred learning methods  6/30/2025 0747 by Brannon Traylor RN  Outcome: Progressing  6/30/2025 0307 by Brannon Traylor RN  Outcome: Progressing

## 2025-06-30 NOTE — PROGRESS NOTES
Progress Note/Post Op Check - Trauma   Name: Iban Gao 24 y.o. male I MRN: [id number]  Unit/Bed#: [facility] 633-01 I Date of Admission: 6/28/2025   Date of Service: 6/30/2025 I Hospital Day: 2    Assessment & Plan  Self-inflicted injury (HCC)  Bilateral Forearm lacerations sustained in self-harm event   8-10cm on each arm w/o appreciated Nerve damage  Tendons visible but not damaged   OR washout and closure 6/28    -R Arm Closed fully    -L Arm Wound vac   OR today for vac change and possible closure    Continue frequent neurovascular checks   Multimodal pain control     1:1 observation   Psych consultation    -inpatient behavioral health after medically cleared for DC    VTE Prophylaxis: Reason for no pharmacologic prophylaxis vascular injury      Disposition: MS with plan to DC to inpatient psychiatric facility  Medications reviewed. Continue current medications at prescribed doses.        24 Hour Events : no events overnight   Subjective : patient's pain controlled.     Objective :  Temp:  [98.3 °F (36.8 °C)] 98.3 °F (36.8 °C)  HR:  [73-98] 81  BP: (119-142)/(64-74) 142/74  Resp:  [16-20] 17  SpO2:  [93 %-98 %] 98 %  O2 Device: None (Room air)    I/O         06/27 0701  06/28 0700 06/28 0701  06/29 0700    I.V. (mL/kg)  1500 (18.5)    Total Intake(mL/kg)  1500 (18.5)    Net  +1500                  Physical Exam  Vitals and nursing note reviewed.   Constitutional:       General: He is not in acute distress.     Appearance: He is well-developed.   HENT:      Head: Normocephalic and atraumatic.     Eyes:      Conjunctiva/sclera: Conjunctivae normal.       Cardiovascular:      Rate and Rhythm: Normal rate and regular rhythm.      Heart sounds: No murmur heard.  Pulmonary:      Effort: Pulmonary effort is normal. No respiratory distress.      Breath sounds: Normal breath sounds.   Abdominal:      Palpations: Abdomen is soft.      Tenderness: There is no abdominal tenderness.     Musculoskeletal:         General: No  swelling.        Arms:       Cervical back: Neck supple.     Skin:     General: Skin is warm and dry.      Capillary Refill: Capillary refill takes less than 2 seconds. Radial and ulnar arterial pulses 2+ and symmetric     Neurological:      Mental Status: He is alert.     Psychiatric:      Comments: Depressed mood                 Lab Results: I have reviewed the following results:  Recent Labs     06/29/25  0457   WBC 15.97*   HGB 13.3   HCT 38.9      BANDSPCT 2   SODIUM 137   K 3.9      CO2 21   BUN 12   CREATININE 0.99   GLUC 105       Imaging Results Review: No pertinent imaging studies reviewed.  Other Study Results Review: No additional pertinent studies reviewed.

## 2025-06-30 NOTE — DISCHARGE INSTR - AVS FIRST PAGE
Discharge Instructions - Orthopedics  Iban Gao 24 y.o. male MRN: [id number]  Unit/Bed#: [facility] 633-01    Weight Bearing Status:                                           Weightbearing status per primary team    Pain:  Continue pain medications as needed.    Dressing Instructions:   Please keep clean, dry and intact until follow up.    Appt Instructions:   No outpatient orthopedic surgery follow-up needed; if you would like to be seen by an orthopedic surgery provider, please call the clinic at [phone]    Contact the office sooner if you experience any increased numbness/tingling in the extremities.      Wound Care  Take dressing down daily, cleanse, apply neosporin x 1 week, wrap with kerlex.   Follow up with general surgery clinic and scheduled.

## 2025-06-30 NOTE — ANESTHESIA PREPROCEDURE EVALUATION
Procedure:  DEBRIDEMENT UPPER EXTREMITY (WASH OUT) (Left: Arm Lower)  CLOSURE WOUND EXTREMITY (Left: Arm Lower)    Relevant Problems   No relevant active problems        Physical Exam    Airway     Mallampati score: II  TM Distance: >3 FB  Neck ROM: full      Cardiovascular  Rhythm: regular, Rate: normalCardiovascular exam normal    Dental   No notable dental hx     Pulmonary  Pulmonary exam normal Breath sounds clear to auscultation    Neurological      Other Findings        Anesthesia Plan  ASA Score- 1     Anesthesia Type- general with ASA Monitors.         Additional Monitors:     Airway Plan: LMA and LMA.    Comment: The patient is having what appears to be paranoid delusions and does not appear to have capacity to make decisions. Risks of general anesthesia discussed including likely possibility of PONV and sore throat, as well as the rare possibilities of aspiration, dental/oropharyngeal/ocular injuries, or grave/life threatening anesthetic and surgical emergencies was explained to the patient and to his father over the phone. They both provided consent..       Plan Factors-Exercise tolerance (METS): >4 METS.    Chart reviewed.    Patient summary reviewed.      Patient instructed to abstain from smoking on day of procedure. Patient did not smoke on day of surgery.            Induction- intravenous.    Postoperative Plan- Plan for postoperative opioid use.   Monitoring Plan -   Post Operative Pain Plan - plan for postoperative opioid use    Perioperative Resuscitation Plan - Level 1 - Full Code.       Informed Consent- Anesthetic plan and risks discussed with father.  I personally reviewed this patient with the CRNA. Discussed and agreed on the Anesthesia Plan with the CRNA..      NPO Status:  No vitals data found for the desired time range.

## 2025-06-30 NOTE — ASSESSMENT & PLAN NOTE
Iban Gao is a 24 y.o. male with no significant past medical history and past psychiatric history starting for psychotic and paranoid symptoms and bipolar disorder.  Patient is admitted to the hospital secondary to lacerations on his forearms that are self-inflicted in a suicide attempt.  Psychiatry sent to consult secondary to his suicidal ideation and being status post suicide attempt.    At this time based on patient's presentation and the history that he gives as well as review of collateral obtained from mother I am most suspicious for patient having schizoaffective disorder, potentially bipolar type.  At this time I believe that patient meets criteria for inpatient psychiatric admission.  He is willing to sign a 201 at this time.  I will also recommend starting Haldol and at bedtime to help with primary symptoms of psychosis and Seroquel 50 mg at bedtime for sleep given patient's poor experiences trazodone in the past.  Social work will meet with patient later today to sign 201 and will start bed search once medically cleared.    - Start Haldol 5 mg at bedtime  -Start Seroquel 50 mg at bedtime  -Please obtain baseline EKG to assess Qtc  - Social work to meet patient later today to sign 201 and start bed search when medically cleared  -Patient may not leave AMA.  If he expresses wanting to leave AMA or wanting to rescind his 201 he should be 302'd at that time  -Please see below for medications for acute agitation    #) Medications for Agitation  - Would recommend verbal de-escalation first for agitation. Please offer voluntary medications orally if able.  - Would recommend the following medications if necessary for pt's safety or safety of others (please hold for oversedation):         Haldol 5mg PO/IV/IM           Ativan 2mg PO/IV/IM         Cogentin 1mg PO/IV/IM vs    - Please consider EKG as appropriate to monitor for QTc prolongation (>450) specifically if medications are used more than one time or  patient has received >1 antipsychotic medication.  - Monitor electrolyte imbalances w/ goal K >4.0, Mg >2.0

## 2025-06-30 NOTE — UTILIZATION REVIEW
Initial Clinical Review    Admission: Date/Time/Statement:   Admission Orders (From admission, onward)       Ordered        06/28/25 2307  Inpatient Admission  Once                          Orders Placed This Encounter   Procedures    Inpatient Admission     Standing Status:   Standing     Number of Occurrences:   1     Level of Care:   Level 2 Stepdown / HOT [14]     Estimated length of stay:   More than 2 Midnights     Certification:   I certify that inpatient services are medically necessary for this patient for a duration of greater than two midnights. See H&P and MD Progress Notes for additional information about the patient's course of treatment.     ED Arrival Information       Expected   -    Arrival   6/28/2025 21:33    Acuity   -              Means of arrival   Helicopter    Escorted by   [facility]    Service   Trauma    Admission type   Trauma Center              Arrival complaint   Trauma               Initial Presentation: 24 y.o. male to ED via Helicopter   Present to ED after a suicide attempt to cut bilateral arms with a pocket knife. The patient created 8 to 10 cm lacerations in both of his arms in an attempt to kill himself. The patient does not appear to have any nerve involvement at this time.  Patient able to conduct radial ulnar and median nerve hand movements.  Radial pulses 2+ bilaterally.  Due to the extent of the injuries the patient will go to the operating room for OR washout and closure.   PMHX No past medical history   Admitted to  - Level 2 Stepdown with DX: suicide attempt   on exam: T 100.1; tachy; hypertensive; Left forearm traumatic wound measuring 11 x 7 x 1 cm with active arterial bleeding. Right forearm traumatic wound measuring 14.5 x 6 x 1 cm with active bleeding  PLAN: ivf; monitor labs; pain / nausea control; 1:1 continuous watch; neurovascular checks Q2H; After x-rays of the arm ruled out any foreign body patient was taken directly to the operating theater for  hemohemorrhage control and wound care. NPO for OR;  consult        OP REPORT   SURGERY DATE: 6/28/2025   Procedure(s): Bilateral - DEBRIDEMENT UPPER EXTREMITY (WASH OUT) bilateral. primary closure of right arm wound, partial closure of left arm wound  Left - APPLICATION VAC DRESSING left forearm  Anesthesia Type: General  Operative Findings: Bilateral forearm lacerations. Bilateral radial and ulnar pulses palpable at the start and conclusion of the case. Right forearm wound closed. Left forearm wound partially closed with vac placed due to bulging of muscle. One black sponge in wound.  Infection was present at time of surgery as evidenced by infected fluid/tissue.        Date: 6/29/25     Day 2   POD #1 s/p washout and closure on right and washout partial closure on left with VAC placement   Right Arm fully closed. Left Arm closed partial with Wound vac placed   Plan: Local wound care-will take back to the OR in 24 hours for VAC change and possible closure of left arm;  ivf; monitor labs; pain / nausea control; 1:1 continuous watch; neurovascular checks; CM consulted       ED Treatment-Medication Administration from 06/28/2025 2125 to 06/28/2025 2204         Date/Time Order Dose Route Action     06/28/2025 2139 tetanus-diphtheria-acellular pertussis (BOOSTRIX) IM injection 0.5 mL 0.5 mL Intramuscular Given     06/28/2025 2141 fentaNYL injection 50 mcg Intravenous Given            Scheduled Medications:  acetaminophen, 975 mg, Oral, Q8H JUSTIN      Continuous IV Infusions:  lactated ringers, 125 mL/hr, Intravenous, Continuous      PRN Meds:  HYDROmorphone, 0.5 mg, Intravenous, Q4H PRN: 6/29 x4  oxyCODONE, 5 mg, Oral, Q4H PRN: 6/29 x5  oxyCODONE, 2.5 mg, Oral, Q4H PRN    ondansetron (ZOFRAN) injection 4 mg  Dose: 4 mg  Freq: Once as needed Route: IV  PRN Reason: nausea  PRN Comment: First Line For Nausea  Start: 06/29/25 0013 End: 06/29/25 0037      ED Triage Vitals   Temperature Pulse Respirations Blood Pressure SpO2  Pain Score   06/28/25 2140 06/28/25 2135 06/28/25 2135 06/28/25 2135 06/28/25 2135 06/29/25 0030   100.1 °F (37.8 °C) (!) 117 18 168/91 97 % 7     Weight (last 2 days)       Date/Time Weight    06/29/25 0109 81.2 (179.01)    06/28/25 21:40:06 83.9 (184.97)            Vital Signs (last 3 days)       Date/Time Temp Pulse Resp BP MAP (mmHg) SpO2 O2 Flow Rate (L/min) O2 Device Cardiac (WDL) Patient Position - Orthostatic VS [place] Coma Scale Score Pain    06/30/25 0949 -- -- -- -- -- -- -- -- -- -- -- 6    06/30/25 07:08:39 97.9 °F (36.6 °C) 69 16 137/69 84 96 % -- -- -- -- -- --    06/30/25 0516 -- -- -- -- -- -- -- -- -- -- -- 9    06/30/25 0256 -- -- -- -- -- -- -- -- -- -- -- 4    06/29/25 2357 -- -- -- -- -- -- -- -- -- -- -- 7 06/29/25 2249 -- -- -- -- -- -- -- -- -- -- -- Med Not Given for Pain - for MAR use only    06/29/25 22:39:06 -- 81 17 142/74 94 98 % -- -- -- -- -- --    06/29/25 2040 -- -- -- -- -- -- -- -- -- -- 15 --    06/29/25 2036 -- -- -- -- -- -- -- -- -- -- -- 7 06/29/25 1828 -- -- -- -- -- -- -- -- -- -- -- 7 06/29/25 1600 -- -- -- -- -- 96 % -- -- -- -- 15 --    06/29/25 1513 -- -- -- -- -- -- -- -- -- -- -- 8 06/29/25 1458 -- 73 20 129/72 93 96 % -- None (Room air) -- Lying -- --    06/29/25 1320 -- -- -- -- -- -- -- -- -- -- -- 8 06/29/25 0954 -- -- -- -- -- -- -- -- -- -- -- 7    06/29/25 0946 -- 80 -- -- -- -- -- -- -- -- -- --    06/29/25 0814 98.3 °F (36.8 °C) 98 16 119/64 89 -- -- -- -- Lying -- --    06/29/25 0810 -- -- -- -- -- -- -- -- -- -- -- 5 06/29/25 0800 -- -- -- -- -- 93 % -- None (Room air) -- -- 15 --    06/29/25 0508 -- -- -- -- -- -- -- -- -- -- -- 5 06/29/25 0300 -- -- -- -- -- -- -- -- -- -- 15 --    06/29/25 0208 98.1 °F (36.7 °C) 75 -- 111/60 78 -- -- -- -- Lying -- --    06/29/25 0138 -- -- -- -- -- -- -- -- -- -- 15 7 06/29/25 01:08:22 98.4 °F (36.9 °C) 83 18 120/69 86 96 % -- -- -- -- -- --    06/29/25 0049 -- -- -- -- -- -- -- -- -- -- -- 6     06/29/25 0045 -- 82 16 117/57 80 99 % -- -- -- -- -- --    06/29/25 0036 -- -- -- -- -- -- -- -- -- -- -- 5    06/29/25 0030 -- 89 18 110/59 76 99 % -- -- -- -- -- 7    06/29/25 0015 -- 64 22 99/52 71 98 % -- -- -- -- -- --    06/29/25 0005 99.1 °F (37.3 °C) 66 22 98/43 62 98 % 6 L/min Simple mask WDL -- 15 --    06/28/25 2155 -- 74 18 144/78 -- 97 % -- None (Room air) -- -- 15 --    06/28/25 2150 -- 81 18 143/69 -- 97 % -- None (Room air) -- -- 15 --    06/28/25 2145 -- 92 18 141/80 -- 98 % -- None (Room air) -- -- 15 --    06/28/25 21:40:06 100.1 °F (37.8 °C) 122 18 138/83 -- 97 % -- None (Room air) -- -- 15 --    06/28/25 21:35:51 -- 117 18 168/91 -- 97 % -- None (Room air) -- -- 15 --              Pertinent Labs/Diagnostic Test Results:   Radiology:  XR forearm 2 vw right   Final Interpretation by Blair Baldwin MD (06/30 1005)      No acute osseous abnormality.      Diffuse soft tissue emphysema of the right forearm, in keeping with recent surgery (debridement and washout).         Computerized Assisted Algorithm (CAA) may have been used to analyze all applicable images.            Resident: ROSSY Baldwin I, the attending radiologist, have reviewed the images and agree with the final report above.      Workstation performed: [id number]         XR forearm 2 vw left   Final Interpretation by Blair Baldwin MD (06/30 1005)      No acute osseous abnormality.      Diffuse soft tissue emphysema of the left forearm, sequela of laceration injury given recent surgery.         Computerized Assisted Algorithm (CAA) may have been used to analyze all applicable images.            Resident: ROSSY Baldwin I, the attending radiologist, have reviewed the images and agree with the final report above.      Workstation performed: [id number]           Cardiology:  ECG 12 lead    by Interface, Ris Results In (06/30 1220)           Results from last 7 days   Lab Units 06/29/25  0457 06/28/25  2145   WBC Thousand/uL  15.97*  --    HEMOGLOBIN g/dL 13.3  --    I STAT HEMOGLOBIN g/dl  --  16.0   HEMATOCRIT % 38.9  --    HEMATOCRIT, ISTAT %  --  47   PLATELETS Thousands/uL 236  --    BANDS PCT % 2  --          Results from last 7 days   Lab Units 06/30/25  0622 06/29/25  0457 06/28/25  2145   SODIUM mmol/L 138 137  --    POTASSIUM mmol/L 3.7 3.9  --    CHLORIDE mmol/L 104 102  --    CO2 mmol/L 25 21  --    CO2, I-STAT mmol/L  --   --  21   ANION GAP mmol/L 9 14*  --    BUN mg/dL 9 12  --    CREATININE mg/dL 0.90 0.99  --    EGFR ml/min/1.73sq m 117 104  --    CALCIUM mg/dL 8.6 8.4  --    CALCIUM, IONIZED, ISTAT mmol/L  --   --  1.08*        Results from last 7 days   Lab Units 06/30/25  0622 06/29/25  0457   GLUCOSE RANDOM mg/dL 90 105        Results from last 7 days   Lab Units 06/28/25  2145   PH, BECCA I-STAT  7.479*   PCO2, BECCA ISTAT mm HG 26.9*   PO2, BECCA ISTAT mm HG 31.0*   HCO3, BECCA ISTAT mmol/L 20.0*   I STAT BASE EXC mmol/L -2   I STAT O2 SAT % 66          Past Medical History[1]  Present on Admission:   Laceration of right forearm   Laceration of left forearm      Admitting Diagnosis: Unspecified multiple injuries, initial encounter [T07.XXXA]  Age/Sex: 24 y.o. male    Network Utilization Review Department  ATTENTION: Please call with any questions or concerns to [phone] and carefully listen to the prompts so that you are directed to the right person. All voicemails are confidential.   For Discharge needs, contact Care Management DC Support Team at [phone] opt. 2  Send all requests for admission clinical reviews, approved or denied determinations and any other requests to dedicated fax number below belonging to the campus where the patient is receiving treatment. List of dedicated fax numbers for the Facilities:  FACILITY NAME UR FAX NUMBER   ADMISSION DENIALS (Administrative/Medical Necessity) [phone]   DISCHARGE SUPPORT TEAM (NETWORK) [phone]   PARENT CHILD HEALTH (Maternity/NICU/Pediatrics)  [phone]   General acute hospital [phone]   [facility] [phone]   [facility] [phone]   [facility] [phone]   [facility] [phone]   [facility] [phone]   [facility] [phone]   [facility] [phone]   [facility] [phone]   Critical access hospital [phone]   [facility] [phone]   [facility] [phone]              [1] No past medical history on file.

## 2025-06-30 NOTE — CASE MANAGEMENT
Case Management Discharge Planning Note    Patient name Iban Gao  Location [facility]-01 MRN [id number]  : 2000 Date 2025       Current Admission Date: 2025  Current Admission Diagnosis:Self-inflicted injury (HCC)   Patient Active Problem List    Diagnosis Date Noted    Psychosis (HCC) r/o schizoaffective d/o vs bipolar w/ psychosis 2025    Laceration of right forearm 2025    Laceration of left forearm 2025    Self-inflicted injury (HCC) 2025      LOS (days): 2  Geometric Mean LOS (GMLOS) (days):   Days to GMLOS:     OBJECTIVE:  Risk of Unplanned Readmission Score: 5.4         Current admission status: Inpatient   Preferred Pharmacy: No Pharmacies Listed  Primary Care Provider: Blaine Cotter MD    Primary Insurance:   Secondary Insurance:     DISCHARGE DETAILS:    Plan for OR today with surgery for vac change and possible closure  Of note, pt will be a 201 when medically stable, at which point, CM will begin bed search

## 2025-06-30 NOTE — PROGRESS NOTES
Progress Note - Surgery-General   Name: Iban Gao 24 y.o. male I MRN: [id number]  Unit/Bed#: [facility] 633-01 I Date of Admission: 6/28/2025   Date of Service: 6/30/2025 I Hospital Day: 2    Assessment & Plan  Self-inflicted injury (HCC)  Laceration of right forearm  Laceration of left forearm  - Bilateral upper extremity washout, primary closure of right laceration, partial closure of left laceration with wound vac placement 6/28  - Diet as tolerated  - Remove right arm dressing 6/30  - Or 6/30 for left arm vac change and possible closure  - Multimodal analgesia  - Remainder of care per ICU team        Subjective   NAEON. Pain controlled. Tolerating diet. Denies numbness, tingling in b/l UE. Denies fever, chills, SOB, CP.    Objective :  Temp:  [98.3 °F (36.8 °C)] 98.3 °F (36.8 °C)  HR:  [73-98] 81  BP: (119-142)/(64-74) 142/74  Resp:  [16-20] 17  SpO2:  [93 %-98 %] 98 %  O2 Device: None (Room air)    I/O         06/28 0701  06/29 0700 06/29 0701  06/30 0700    P.O.  120    I.V. (mL/kg) 1500 (18.5)     Total Intake(mL/kg) 1500 (18.5) 120 (1.5)    Net +1500 +120          Unmeasured Urine Occurrence  1 x            Physical Exam  General: NAD  HENT: NCAT MMM  Neck: supple, no JVD  CV: nl rate  Lungs: nl wob. No resp distress  ABD: Soft, nontender, nondistended  Extrem: b/l UE with ace wrap. M/S intact with palpable radial pulses  Neuro: AAOx3      Lab Results: I have reviewed the following results:  Recent Labs     06/29/25  0457   WBC 15.97*   HGB 13.3   HCT 38.9      BANDSPCT 2   SODIUM 137   K 3.9      CO2 21   BUN 12   CREATININE 0.99   GLUC 105             VTE Pharmacologic Prophylaxis: VTE covered by:    None     VTE Mechanical Prophylaxis: sequential compression device

## 2025-06-30 NOTE — CONSULTS
Consultation - Behavioral Health   Iban Gao 24 y.o. male MRN: [id number]  Unit/Bed#: [facility] 633-01 Encounter: [id number]    Assessment & Plan  Psychosis (HCC) r/o schizoaffective d/o vs bipolar w/ psychosis  Iban Gao is a 24 y.o. male with no significant past medical history and past psychiatric history starting for psychotic and paranoid symptoms and bipolar disorder.  Patient is admitted to the hospital secondary to lacerations on his forearms that are self-inflicted in a suicide attempt.  Psychiatry sent to consult secondary to his suicidal ideation and being status post suicide attempt.    At this time based on patient's presentation and the history that he gives as well as review of collateral obtained from mother I am most suspicious for patient having schizoaffective disorder, potentially bipolar type.  At this time I believe that patient meets criteria for inpatient psychiatric admission.  He is willing to sign a 201 at this time.  I will also recommend starting Haldol and at bedtime to help with primary symptoms of psychosis and Seroquel 50 mg at bedtime for sleep given patient's poor experiences trazodone in the past.  Social work will meet with patient later today to sign 201 and will start bed search once medically cleared.    - Start Haldol 5 mg at bedtime  -Start Seroquel 50 mg at bedtime  -Please obtain baseline EKG to assess Qtc  - Social work to meet patient later today to sign 201 and start bed search when medically cleared  -Patient may not leave AMA.  If he expresses wanting to leave AMA or wanting to rescind his 201 he should be 302'd at that time  -Please see below for medications for acute agitation    #) Medications for Agitation  - Would recommend verbal de-escalation first for agitation. Please offer voluntary medications orally if able.  - Would recommend the following medications if necessary for pt's safety or safety of others (please hold for oversedation):         Haldol 5mg  "PO/IV/IM           Ativan 2mg PO/IV/IM         Cogentin 1mg PO/IV/IM vs    - Please consider EKG as appropriate to monitor for QTc prolongation (>450) specifically if medications are used more than one time or patient has received >1 antipsychotic medication.  - Monitor electrolyte imbalances w/ goal K >4.0, Mg >2.0      Self-inflicted injury (HCC)    Laceration of right forearm    Laceration of left forearm         Diagnoses, available treatment options, alternatives to treatment, and risks vs. benefits of current psychiatric treatment plan were discussed with the patient.  Prior records were reviewed in [employer].  The case was discussed with the primary team.    Scheduled medications:  Current Facility-Administered Medications   Medication Dose Route Frequency Provider Last Rate    acetaminophen  975 mg Oral Q8H JUSTIN Marck Bonner MD      HYDROmorphone  0.5 mg Intravenous Q4H PRN Chavez Bosch MD      lactated ringers  125 mL/hr Intravenous Continuous Chris Peacock CRNA 125 mL/hr (06/29/25 0132)    oxyCODONE  5 mg Oral Q4H PRN FITO Gallagher      oxyCODONE  2.5 mg Oral Q4H PRN Chavez Bosch MD          PRN:    HYDROmorphone    oxyCODONE    oxyCODONE    Chief Complaint: \"The gangs are out to get me\"    History of Present Illness   Physician Requesting Consult: Carlton Moncada MD  Reason for Consult / Principal Problem: s/p SA    Iban Gao is a 24 y.o. male with no significant past medical history and past psychiatric history starting for psychotic and paranoid symptoms and bipolar disorder.  Patient is admitted to the hospital secondary to lacerations on his forearms that are self-inflicted in a suicide attempt.  Psychiatry sent to consult secondary to his suicidal ideation and being status post suicide attempt.    Patient was seen this morning in his room.  He is initially calm but becomes quite tearful and nervous.  He states that he tried to kill himself secondary to his significant paranoia " that gangs were out to get him.  He notes that he feels that they have been driving in front of his house all times of the day and this anxiety and paranoia has prevented him from sleeping.  Patient does note periods of time where he has not had psychotic symptoms that also coincided with periods of decreased need for sleep and increased goal-directed behaviors.  At this time however he denies any symptoms of rl and feels more depressed.     Patient further endorses continued suicidal ideation without plan as well as intermittent visual hallucinations of shadows and eyes in the walls.  He denies any command auditory hallucinations but does feel that he is may be hearing something when he tries to go to sleep although he does endorse that this might just be noises coming from outside of his room in the evening.  Patient continues to endorse significant paranoia although feels safe here in the hospital.  Patient notes that his symptoms are chronic at this point and started around 2020 and has had intermittent episodes of paranoia combined with psychotic symptoms.  This is supported from collateral information that social work obtained from patient's mother.    Patient is initially resistant to starting psychotropic medication but is amenable eventually to Haldol as a standing antipsychotic and Seroquel for sleep.  Patient states that he previously tried trazodone for sleep but it gave him bad nightmares.  He is also amenable to signing a 201 for inpatient psychiatric admission and states that he has been to the inpatient psych at least once before for self-harm.  He gives me verbal permission to reach out to his parents to provide update.    I called patient's father number listed in chart and was unable to contact him.  I left a message to update him regarding my recommendations for initiation of psychotropic medications as well as inpatient psychiatric admission.      Psychiatric Review Of Systems:  Medication  side effects: none  Sleep: decreased  Appetite: no change  Hygiene: able to tend to instrumental and basic ADLs  Anxiety Symptoms: endorses  Psychotic Symptoms: endorses  Depression Symptoms: endorses  Manic Symptoms: denies  PTSD Symptoms: denies  Suicidal Thoughts: endorses  Homicidal Thoughts: denies      Historical Information   Past Psychiatric History  Diagnoses: bipolar d/o  Medication History: seroquel, depakote, trazadone  Inpatient: 1x previously  Outpatient: has seen in the past but none current  Suicide Attempts: none prior  Self Injurious Behavior: endorses    Substance Abuse History:    Uses LSD sporadically.  Endorses last use being 1 week ago.  States that the experience that he has on LSD is not similar to what he is experiencing right now    Social History     Substance and Sexual Activity   Alcohol Use Not on file     Social History     Substance and Sexual Activity   Drug Use Not on file       I discussed substance abuse with the patient and, if pertinent, discussed risks vs benefits of decreasing frequency of use.    Family Psychiatric History:   Family History[1]      Social History  Currently living: w/ dad  Relationships: family  Gun Ownership: present in house but pt states he does not have access to them as they are locked away     Rest of social history as per below:  Social History     Socioeconomic History    Marital status: Single     Spouse name: Not on file    Number of children: Not on file    Years of education: Not on file    Highest education level: Not on file   Occupational History    Not on file   Tobacco Use    Smoking status: Not on file    Smokeless tobacco: Not on file   Substance and Sexual Activity    Alcohol use: Not on file    Drug use: Not on file    Sexual activity: Not on file   Other Topics Concern    Not on file   Social History Narrative    Not on file     Social Drivers of Health     Financial Resource Strain: Not on file   Food Insecurity: Not on file  "  Transportation Needs: Not on file   Physical Activity: Not on file   Stress: Not on file   Social Connections: Not on file   Intimate Partner Violence: Not on file   Housing Stability: Not on file         Traumatic History:   deferred    Medical Review Of Systems:  Review of Systems - no acute physical complaints or concerns. Still some pain at laceration site  All other systems were reviewed and are negative    Relevant PMH/PSH:   Past Medical History[2]  Past Surgical History[3]      Meds/Allergies   all current active meds have been reviewed, current meds:   Current Facility-Administered Medications:     acetaminophen (TYLENOL) tablet 975 mg, Q8H JUSTIN    HYDROmorphone (DILAUDID) injection 0.5 mg, Q4H PRN    lactated ringers infusion, Continuous, Last Rate: 125 mL/hr (06/29/25 0132)    oxyCODONE (ROXICODONE) IR tablet 5 mg, Q4H PRN    oxyCODONE (ROXICODONE) split tablet 2.5 mg, Q4H PRN, and PTA meds:   None     Allergies[4]    Objective   Vital signs in last 24 hours:  Temp:  [97.9 °F (36.6 °C)] 97.9 °F (36.6 °C)  HR:  [69-81] 69  BP: (129-142)/(69-74) 137/69  Resp:  [16-20] 16  SpO2:  [96 %-98 %] 96 %  O2 Device: None (Room air)      Intake/Output Summary (Last 24 hours) at 6/30/2025 1141  Last data filed at 6/30/2025 0701  Gross per 24 hour   Intake 0 ml   Output 50 ml   Net -50 ml       Mental Status Evaluation:  Appearance: disheveled, appears consistent with stated age  Motor: no psychomotor disturbances  Behavior: superficially cooperative, guarded  Speech: normal rate and rhythm  Mood: \"scared\"  Affect: tearful, anxious  Thought Process: circumstantial at times  Thought Content: paranoia and delusions regarding gang being out to get him  Risk Potential: suicidal ideation w/o plan, or intent. Denies homicidal ideation  Perceptions: -IP/RIS, denies auditory hallucinations, endorses visual hallucinations intermittently  Sensorium: Oriented to person, place, time, and situation  Cognition: cognitive ability " "appears intact but was not quantitatively tested  Consciousness: alert and awake  Attention: intact  Insight: limited  Judgement: limited      Lab Results:    I have personally reviewed all pertinent laboratory/tests results.    CBC  Lab Results   Component Value Date    WBC 15.97 (H) 06/29/2025    RBC 4.38 06/29/2025    HGB 13.3 06/29/2025    HCT 38.9 06/29/2025    MCH 30.4 06/29/2025    MCV 89 06/29/2025     06/29/2025    RDW 11.9 06/29/2025       BMP  Lab Results   Component Value Date    K 3.7 06/30/2025     06/30/2025    CO2 25 06/30/2025    BUN 9 06/30/2025       LFTs  No results found for: \"ALB\", \"TP\", \"DBILI\", \"TBILI\"    TSH  No results found for: \"TSH\"    COVID-19  No results found for: \"SARSCOV2\"    UDS  No results found for: \"AMPMETHUR\", \"BARBTUR\", \"BDZUR\", \"THCUR\", \"COCAINEUR\", \"METHADONEUR\", \"OPIATEUR\", \"PCPUR\", \"ECSTASYUR\"    Medical Alcohol Level  No results found for: \"ETOH\"    EKG    No results found for this or any previous visit (from the past 4464 hours).    Code Status: Level 1 - Full Code    Counseling / Coordination of Care  I have spent a total time of 45 minutes on 06/30/25 in caring for this patient including reviewing the chart, interviewing the patient, documenting in the medical record and discussing with the primary team.    Alexis Crawford MD on         [1] No family history on file.  [2] No past medical history on file.  [3] No past surgical history on file.  [4] No Known Allergies    "

## 2025-06-30 NOTE — PROGRESS NOTES
Progress Note - Orthopedics   Name: Iban Gao 24 y.o. male I MRN: [id number]  Unit/Bed#: [facility] 633-01 I Date of Admission: 6/28/2025   Date of Service: 6/30/2025 I Hospital Day: 2    Assessment & Plan  Laceration of right forearm  24M with laceration of right volar forearm. Currently neurovascularly intact.    Plan:  Continue to monitor neurovascular exam  Pain control  Rest of care as per primary   Laceration of left forearm  24M with laceration of left volar forearm. Currently neurovascularly intact. Wound vac in place, managed by trauma surgery with plan to return to OR today for possible wound closure or vac exchange.     Plan:  Continue to monitor neurovascular exam   Pain control   Rest of care as per primary     Please contact the SecureChat role for the Orthopedics service with any questions/concerns.    Subjective   No acute events, no acute distress. Denies fever/chills, SOB. Pain well controlled. Subjective numbness of the left long, ring, and small fingers is improving.     Objective      Temp:  [98.3 °F (36.8 °C)] 98.3 °F (36.8 °C)  HR:  [73-98] 81  BP: (119-142)/(64-74) 142/74  Resp:  [16-20] 17  SpO2:  [93 %-98 %] 98 %  O2 Device: None (Room air)  O2 Device: None (Room air)          I/O         06/28 0701  06/29 0700 06/29 0701  06/30 0700    P.O.  120    I.V. (mL/kg) 1500 (18.5)     Total Intake(mL/kg) 1500 (18.5) 120 (1.5)    Net +1500 +120          Unmeasured Urine Occurrence  1 x            Physical Exam   Musculoskeletal: Right Upper Extremity  Volar laceration closed  TTP dean-incisionally  Sensation intact to light touch m/r/u  Motor intact m/r/u/ain/pin  Flexion and extension at the MCP, PIP, and DIP joints intact in all digits and at the thumb MCP and IP joints   2+ rad pulse    Musculoskeletal: Left Upper Extremity  Wound vac in place over laceration, pulling suction appropriately  TTP dean-incisionally  Sensation intact to light touch m/r/u, diminished sensation relativ eto the right  "upper extremity  Motor intact m/r/u/ain/pin  Flexion and extension at the MCP, PIP, and DIP joints intact in all digits and at the thumb MCP and IP joints   2+ rad pulse      Lab Results: I have reviewed the following results:  Recent Labs     06/29/25  0457   WBC 15.97*   HGB 13.3   HCT 38.9      BANDSPCT 2   BUN 12   CREATININE 0.99     Blood Culture:  No results found for: \"BLOODCX\"  Wound Culture: No results found for: \"WOUNDCULT\"  "

## 2025-07-01 ENCOUNTER — HOSPITAL ENCOUNTER (INPATIENT)
Facility: HOSPITAL | Age: 25
LOS: 34 days | Discharge: HOME | DRG: 761 | End: 2025-08-04
Attending: STUDENT IN AN ORGANIZED HEALTH CARE EDUCATION/TRAINING PROGRAM | Admitting: STUDENT IN AN ORGANIZED HEALTH CARE EDUCATION/TRAINING PROGRAM
Payer: COMMERCIAL

## 2025-07-01 VITALS
DIASTOLIC BLOOD PRESSURE: 57 MMHG | WEIGHT: 179.01 LBS | BODY MASS INDEX: 23.73 KG/M2 | TEMPERATURE: 98 F | OXYGEN SATURATION: 100 % | HEIGHT: 73 IN | SYSTOLIC BLOOD PRESSURE: 122 MMHG | HEART RATE: 80 BPM | RESPIRATION RATE: 16 BRPM

## 2025-07-01 DIAGNOSIS — S51.812A LACERATION OF LEFT FOREARM: ICD-10-CM

## 2025-07-01 DIAGNOSIS — S51.811A LACERATION OF RIGHT FOREARM: ICD-10-CM

## 2025-07-01 DIAGNOSIS — Z00.8 MEDICAL CLEARANCE FOR PSYCHIATRIC ADMISSION: ICD-10-CM

## 2025-07-01 DIAGNOSIS — F25.0 SCHIZOAFFECTIVE DISORDER, BIPOLAR TYPE (HCC): Primary | Chronic | ICD-10-CM

## 2025-07-01 LAB
ATRIAL RATE: 66 BPM
ATRIAL RATE: 70 BPM
P AXIS: 268 DEGREES
P AXIS: 269 DEGREES
PR INTERVAL: 150 MS
PR INTERVAL: 154 MS
QRS AXIS: 252 DEGREES
QRS AXIS: 252 DEGREES
QRSD INTERVAL: 94 MS
QRSD INTERVAL: 98 MS
QT INTERVAL: 404 MS
QT INTERVAL: 408 MS
QTC INTERVAL: 428 MS
QTC INTERVAL: 436 MS
T WAVE AXIS: 251 DEGREES
T WAVE AXIS: 253 DEGREES
VENTRICULAR RATE: 66 BPM
VENTRICULAR RATE: 70 BPM

## 2025-07-01 PROCEDURE — 93005 ELECTROCARDIOGRAM TRACING: CPT

## 2025-07-01 PROCEDURE — 93010 ELECTROCARDIOGRAM REPORT: CPT | Performed by: INTERNAL MEDICINE

## 2025-07-01 PROCEDURE — 99024 POSTOP FOLLOW-UP VISIT: CPT | Performed by: SURGERY

## 2025-07-01 PROCEDURE — NC001 PR NO CHARGE: Performed by: SURGERY

## 2025-07-01 PROCEDURE — 99024 POSTOP FOLLOW-UP VISIT: CPT

## 2025-07-01 PROCEDURE — 99233 SBSQ HOSP IP/OBS HIGH 50: CPT | Performed by: PSYCHIATRY & NEUROLOGY

## 2025-07-01 RX ORDER — AMOXICILLIN 250 MG
1 CAPSULE ORAL DAILY PRN
Status: DISCONTINUED | OUTPATIENT
Start: 2025-07-01 | End: 2025-08-04 | Stop reason: HOSPADM

## 2025-07-01 RX ORDER — HALOPERIDOL 5 MG/ML
5 INJECTION INTRAMUSCULAR
Status: DISCONTINUED | OUTPATIENT
Start: 2025-07-01 | End: 2025-08-04 | Stop reason: HOSPADM

## 2025-07-01 RX ORDER — HALOPERIDOL 2 MG/1
2 TABLET ORAL
Status: DISCONTINUED | OUTPATIENT
Start: 2025-07-01 | End: 2025-08-04 | Stop reason: HOSPADM

## 2025-07-01 RX ORDER — ENOXAPARIN SODIUM 100 MG/ML
30 INJECTION SUBCUTANEOUS EVERY 12 HOURS SCHEDULED
Status: DISCONTINUED | OUTPATIENT
Start: 2025-07-01 | End: 2025-07-01 | Stop reason: HOSPADM

## 2025-07-01 RX ORDER — LORAZEPAM 2 MG/ML
1 INJECTION INTRAMUSCULAR
Status: CANCELLED | OUTPATIENT
Start: 2025-07-01

## 2025-07-01 RX ORDER — HALOPERIDOL 5 MG/ML
2.5 INJECTION INTRAMUSCULAR
Status: DISCONTINUED | OUTPATIENT
Start: 2025-07-01 | End: 2025-07-09

## 2025-07-01 RX ORDER — IBUPROFEN 400 MG/1
800 TABLET, FILM COATED ORAL EVERY 8 HOURS PRN
Status: CANCELLED | OUTPATIENT
Start: 2025-07-01

## 2025-07-01 RX ORDER — BENZTROPINE MESYLATE 1 MG/ML
0.5 INJECTION, SOLUTION INTRAMUSCULAR; INTRAVENOUS
Status: DISCONTINUED | OUTPATIENT
Start: 2025-07-01 | End: 2025-07-09

## 2025-07-01 RX ORDER — IBUPROFEN 400 MG/1
400 TABLET, FILM COATED ORAL EVERY 4 HOURS PRN
Status: CANCELLED | OUTPATIENT
Start: 2025-07-01

## 2025-07-01 RX ORDER — PROPRANOLOL HYDROCHLORIDE 10 MG/1
10 TABLET ORAL EVERY 8 HOURS PRN
Status: DISCONTINUED | OUTPATIENT
Start: 2025-07-01 | End: 2025-08-04 | Stop reason: HOSPADM

## 2025-07-01 RX ORDER — MAGNESIUM HYDROXIDE/ALUMINUM HYDROXICE/SIMETHICONE 120; 1200; 1200 MG/30ML; MG/30ML; MG/30ML
30 SUSPENSION ORAL EVERY 4 HOURS PRN
Status: DISCONTINUED | OUTPATIENT
Start: 2025-07-01 | End: 2025-08-04 | Stop reason: HOSPADM

## 2025-07-01 RX ORDER — DIPHENHYDRAMINE HYDROCHLORIDE 50 MG/ML
50 INJECTION, SOLUTION INTRAMUSCULAR; INTRAVENOUS EVERY 6 HOURS PRN
Status: CANCELLED | OUTPATIENT
Start: 2025-07-01

## 2025-07-01 RX ORDER — BENZTROPINE MESYLATE 1 MG/1
1 TABLET ORAL 2 TIMES DAILY PRN
Status: DISCONTINUED | OUTPATIENT
Start: 2025-07-01 | End: 2025-08-04 | Stop reason: HOSPADM

## 2025-07-01 RX ORDER — HALOPERIDOL 1 MG/1
2 TABLET ORAL
Status: CANCELLED | OUTPATIENT
Start: 2025-07-01

## 2025-07-01 RX ORDER — IBUPROFEN 600 MG/1
600 TABLET, FILM COATED ORAL EVERY 6 HOURS PRN
Status: CANCELLED | OUTPATIENT
Start: 2025-07-01

## 2025-07-01 RX ORDER — BENZTROPINE MESYLATE 1 MG/ML
1 INJECTION, SOLUTION INTRAMUSCULAR; INTRAVENOUS
Status: CANCELLED | OUTPATIENT
Start: 2025-07-01

## 2025-07-01 RX ORDER — TRAZODONE HYDROCHLORIDE 50 MG/1
50 TABLET ORAL
Status: CANCELLED | OUTPATIENT
Start: 2025-07-01

## 2025-07-01 RX ORDER — DIPHENHYDRAMINE HYDROCHLORIDE 50 MG/ML
50 INJECTION, SOLUTION INTRAMUSCULAR; INTRAVENOUS EVERY 6 HOURS PRN
Status: DISCONTINUED | OUTPATIENT
Start: 2025-07-01 | End: 2025-08-04 | Stop reason: HOSPADM

## 2025-07-01 RX ORDER — HALOPERIDOL 5 MG/1
5 TABLET ORAL
Status: DISCONTINUED | OUTPATIENT
Start: 2025-07-01 | End: 2025-08-04 | Stop reason: HOSPADM

## 2025-07-01 RX ORDER — HYDROXYZINE HYDROCHLORIDE 50 MG/1
100 TABLET, FILM COATED ORAL
Status: DISCONTINUED | OUTPATIENT
Start: 2025-07-01 | End: 2025-07-09

## 2025-07-01 RX ORDER — GABAPENTIN 100 MG/1
100 CAPSULE ORAL 3 TIMES DAILY
Status: DISCONTINUED | OUTPATIENT
Start: 2025-07-01 | End: 2025-07-01 | Stop reason: HOSPADM

## 2025-07-01 RX ORDER — ACETAMINOPHEN 325 MG/1
975 TABLET ORAL EVERY 6 HOURS SCHEDULED
Status: CANCELLED | OUTPATIENT
Start: 2025-07-01

## 2025-07-01 RX ORDER — MINERAL OIL AND PETROLATUM 150; 830 MG/G; MG/G
OINTMENT OPHTHALMIC 4 TIMES DAILY PRN
Status: CANCELLED | OUTPATIENT
Start: 2025-07-01

## 2025-07-01 RX ORDER — HALOPERIDOL 5 MG/1
5 TABLET ORAL
Status: CANCELLED | OUTPATIENT
Start: 2025-07-01

## 2025-07-01 RX ORDER — LORAZEPAM 2 MG/ML
2 INJECTION INTRAMUSCULAR
Status: DISCONTINUED | OUTPATIENT
Start: 2025-07-01 | End: 2025-08-04 | Stop reason: HOSPADM

## 2025-07-01 RX ORDER — HYDROXYZINE HYDROCHLORIDE 50 MG/1
50 TABLET, FILM COATED ORAL
Status: DISCONTINUED | OUTPATIENT
Start: 2025-07-01 | End: 2025-08-04 | Stop reason: HOSPADM

## 2025-07-01 RX ORDER — METHOCARBAMOL 750 MG/1
750 TABLET, FILM COATED ORAL EVERY 6 HOURS SCHEDULED
Status: CANCELLED | OUTPATIENT
Start: 2025-07-01

## 2025-07-01 RX ORDER — LORAZEPAM 2 MG/ML
2 INJECTION INTRAMUSCULAR
Status: CANCELLED | OUTPATIENT
Start: 2025-07-01

## 2025-07-01 RX ORDER — HALOPERIDOL 5 MG/ML
2.5 INJECTION INTRAMUSCULAR
Status: CANCELLED | OUTPATIENT
Start: 2025-07-01

## 2025-07-01 RX ORDER — ACETAMINOPHEN 325 MG/1
975 TABLET ORAL EVERY 6 HOURS SCHEDULED
Status: DISCONTINUED | OUTPATIENT
Start: 2025-07-02 | End: 2025-07-08

## 2025-07-01 RX ORDER — BENZTROPINE MESYLATE 1 MG/1
1 TABLET ORAL 2 TIMES DAILY PRN
Status: CANCELLED | OUTPATIENT
Start: 2025-07-01

## 2025-07-01 RX ORDER — QUETIAPINE FUMARATE 25 MG/1
50 TABLET, FILM COATED ORAL
Status: DISCONTINUED | OUTPATIENT
Start: 2025-07-01 | End: 2025-07-05

## 2025-07-01 RX ORDER — HYDROXYZINE HYDROCHLORIDE 50 MG/1
50 TABLET, FILM COATED ORAL
Status: CANCELLED | OUTPATIENT
Start: 2025-07-01

## 2025-07-01 RX ORDER — MINERAL OIL AND PETROLATUM 150; 830 MG/G; MG/G
OINTMENT OPHTHALMIC 4 TIMES DAILY PRN
Status: DISCONTINUED | OUTPATIENT
Start: 2025-07-01 | End: 2025-08-04 | Stop reason: HOSPADM

## 2025-07-01 RX ORDER — IBUPROFEN 600 MG/1
600 TABLET, FILM COATED ORAL EVERY 6 HOURS PRN
Status: DISCONTINUED | OUTPATIENT
Start: 2025-07-01 | End: 2025-07-10

## 2025-07-01 RX ORDER — PROPRANOLOL HYDROCHLORIDE 10 MG/1
10 TABLET ORAL EVERY 8 HOURS PRN
Status: CANCELLED | OUTPATIENT
Start: 2025-07-01

## 2025-07-01 RX ORDER — BENZTROPINE MESYLATE 1 MG/ML
1 INJECTION, SOLUTION INTRAMUSCULAR; INTRAVENOUS
Status: DISCONTINUED | OUTPATIENT
Start: 2025-07-01 | End: 2025-08-04 | Stop reason: HOSPADM

## 2025-07-01 RX ORDER — BISACODYL 10 MG
10 SUPPOSITORY, RECTAL RECTAL DAILY PRN
Status: DISCONTINUED | OUTPATIENT
Start: 2025-07-01 | End: 2025-08-04 | Stop reason: HOSPADM

## 2025-07-01 RX ORDER — METHOCARBAMOL 500 MG/1
750 TABLET, FILM COATED ORAL EVERY 6 HOURS SCHEDULED
Status: DISCONTINUED | OUTPATIENT
Start: 2025-07-02 | End: 2025-07-07

## 2025-07-01 RX ORDER — LORAZEPAM 2 MG/ML
2 INJECTION INTRAMUSCULAR EVERY 6 HOURS PRN
Status: CANCELLED | OUTPATIENT
Start: 2025-07-01

## 2025-07-01 RX ORDER — LORAZEPAM 2 MG/ML
2 INJECTION INTRAMUSCULAR EVERY 6 HOURS PRN
Status: DISCONTINUED | OUTPATIENT
Start: 2025-07-01 | End: 2025-07-09

## 2025-07-01 RX ORDER — HALOPERIDOL 5 MG/1
5 TABLET ORAL
Status: DISCONTINUED | OUTPATIENT
Start: 2025-07-01 | End: 2025-07-05

## 2025-07-01 RX ORDER — GABAPENTIN 100 MG/1
100 CAPSULE ORAL 3 TIMES DAILY
Status: CANCELLED | OUTPATIENT
Start: 2025-07-01

## 2025-07-01 RX ORDER — HYDROXYZINE HYDROCHLORIDE 50 MG/1
100 TABLET, FILM COATED ORAL
Status: CANCELLED | OUTPATIENT
Start: 2025-07-01

## 2025-07-01 RX ORDER — HYDROXYZINE HYDROCHLORIDE 25 MG/1
25 TABLET, FILM COATED ORAL
Status: DISCONTINUED | OUTPATIENT
Start: 2025-07-01 | End: 2025-07-18

## 2025-07-01 RX ORDER — TRAZODONE HYDROCHLORIDE 50 MG/1
50 TABLET ORAL
Status: DISCONTINUED | OUTPATIENT
Start: 2025-07-01 | End: 2025-08-04 | Stop reason: HOSPADM

## 2025-07-01 RX ORDER — METHOCARBAMOL 750 MG/1
750 TABLET, FILM COATED ORAL EVERY 6 HOURS SCHEDULED
Status: DISCONTINUED | OUTPATIENT
Start: 2025-07-01 | End: 2025-07-01 | Stop reason: HOSPADM

## 2025-07-01 RX ORDER — BENZTROPINE MESYLATE 1 MG/ML
0.5 INJECTION, SOLUTION INTRAMUSCULAR; INTRAVENOUS
Status: CANCELLED | OUTPATIENT
Start: 2025-07-01

## 2025-07-01 RX ORDER — LORAZEPAM 2 MG/ML
1 INJECTION INTRAMUSCULAR
Status: DISCONTINUED | OUTPATIENT
Start: 2025-07-01 | End: 2025-07-09

## 2025-07-01 RX ORDER — BENZTROPINE MESYLATE 1 MG/ML
1 INJECTION, SOLUTION INTRAMUSCULAR; INTRAVENOUS 2 TIMES DAILY PRN
Status: DISCONTINUED | OUTPATIENT
Start: 2025-07-01 | End: 2025-08-04 | Stop reason: HOSPADM

## 2025-07-01 RX ORDER — QUETIAPINE FUMARATE 25 MG/1
50 TABLET, FILM COATED ORAL
Status: CANCELLED | OUTPATIENT
Start: 2025-07-01

## 2025-07-01 RX ORDER — AMOXICILLIN 250 MG
1 CAPSULE ORAL DAILY PRN
Status: CANCELLED | OUTPATIENT
Start: 2025-07-01

## 2025-07-01 RX ORDER — ACETAMINOPHEN 325 MG/1
975 TABLET ORAL EVERY 6 HOURS SCHEDULED
Status: DISCONTINUED | OUTPATIENT
Start: 2025-07-01 | End: 2025-07-01 | Stop reason: HOSPADM

## 2025-07-01 RX ORDER — BENZTROPINE MESYLATE 1 MG/ML
1 INJECTION, SOLUTION INTRAMUSCULAR; INTRAVENOUS 2 TIMES DAILY PRN
Status: CANCELLED | OUTPATIENT
Start: 2025-07-01

## 2025-07-01 RX ORDER — MAGNESIUM HYDROXIDE/ALUMINUM HYDROXICE/SIMETHICONE 120; 1200; 1200 MG/30ML; MG/30ML; MG/30ML
30 SUSPENSION ORAL EVERY 4 HOURS PRN
Status: CANCELLED | OUTPATIENT
Start: 2025-07-01

## 2025-07-01 RX ORDER — BISACODYL 10 MG
10 SUPPOSITORY, RECTAL RECTAL DAILY PRN
Status: CANCELLED | OUTPATIENT
Start: 2025-07-01

## 2025-07-01 RX ORDER — GABAPENTIN 100 MG/1
100 CAPSULE ORAL 3 TIMES DAILY
Status: DISCONTINUED | OUTPATIENT
Start: 2025-07-01 | End: 2025-07-03

## 2025-07-01 RX ORDER — IBUPROFEN 400 MG/1
400 TABLET, FILM COATED ORAL EVERY 4 HOURS PRN
Status: DISCONTINUED | OUTPATIENT
Start: 2025-07-01 | End: 2025-07-10

## 2025-07-01 RX ORDER — HALOPERIDOL 5 MG/ML
5 INJECTION INTRAMUSCULAR
Status: CANCELLED | OUTPATIENT
Start: 2025-07-01

## 2025-07-01 RX ORDER — HYDROXYZINE HYDROCHLORIDE 25 MG/1
25 TABLET, FILM COATED ORAL
Status: CANCELLED | OUTPATIENT
Start: 2025-07-01

## 2025-07-01 RX ORDER — IBUPROFEN 800 MG/1
800 TABLET, FILM COATED ORAL EVERY 8 HOURS PRN
Status: DISCONTINUED | OUTPATIENT
Start: 2025-07-01 | End: 2025-07-10

## 2025-07-01 RX ADMIN — METHOCARBAMOL 750 MG: 750 TABLET ORAL at 17:04

## 2025-07-01 RX ADMIN — QUETIAPINE FUMARATE 50 MG: 25 TABLET ORAL at 21:10

## 2025-07-01 RX ADMIN — METHOCARBAMOL 750 MG: 750 TABLET ORAL at 12:04

## 2025-07-01 RX ADMIN — HALOPERIDOL 5 MG: 5 TABLET ORAL at 21:10

## 2025-07-01 RX ADMIN — ACETAMINOPHEN 975 MG: 325 TABLET ORAL at 17:04

## 2025-07-01 RX ADMIN — METHOCARBAMOL 750 MG: 750 TABLET ORAL at 05:20

## 2025-07-01 RX ADMIN — ENOXAPARIN SODIUM 30 MG: 30 INJECTION SUBCUTANEOUS at 09:51

## 2025-07-01 RX ADMIN — GABAPENTIN 100 MG: 100 CAPSULE ORAL at 21:10

## 2025-07-01 RX ADMIN — ACETAMINOPHEN 975 MG: 325 TABLET ORAL at 12:04

## 2025-07-01 RX ADMIN — METHOCARBAMOL 750 MG: 750 TABLET ORAL at 01:19

## 2025-07-01 RX ADMIN — GABAPENTIN 100 MG: 100 CAPSULE ORAL at 16:29

## 2025-07-01 RX ADMIN — ACETAMINOPHEN 975 MG: 325 TABLET ORAL at 05:20

## 2025-07-01 NOTE — QUICK NOTE
Procedure: Debridement and Closure of Left Upper Extremity Wound     Subjective: Patient is resting comfortably. No acute distress. Pain is well controlled. Denies nausea, vomiting, fever, chills, SOB, bowel movements, or voiding. Reports that he passed flatus.     Objective: AVSS on room air     24 y.o. male who presented with self-inflicted bilateral upper extremity lacerations.  Now s/p B/L upper extremity washout, primary closure of right laceration, partial closure of left laceration with wound VAC placement on 6/28    S/p left upper extremity washout and laceration closure.    Plan   diet as tolerated  Remove B/L arm dressings on 7/1  Multimodal analgesia  Remainder of care per trauma team

## 2025-07-01 NOTE — ANESTHESIA POSTPROCEDURE EVALUATION
Post-Op Assessment Note    CV Status:  Stable  Pain Score: 0    Pain management: adequate       Mental Status:  Sleepy   Hydration Status:  Stable   PONV Controlled:  None   Airway Patency:  Patent     Post Op Vitals Reviewed: Yes    No anethesia notable event occurred.    Staff: Anesthesiologist, CRNA           Last Filed PACU Vitals:  Vitals Value Taken Time   Temp     Pulse 87 06/30/25 21:24   /69    Resp 16    SpO2 98 % 06/30/25 21:24   Vitals shown include unfiled device data.

## 2025-07-01 NOTE — PLAN OF CARE
Problem: PAIN - ADULT  Goal: Verbalizes/displays adequate comfort level or baseline comfort level  Description: Interventions:  - Encourage patient to monitor pain and request assistance  - Assess pain using appropriate pain scale  - Administer analgesics as ordered based on type and severity of pain and evaluate response  - Implement non-pharmacological measures as appropriate and evaluate response  - Consider cultural and social influences on pain and pain management  - Notify physician/advanced practitioner if interventions unsuccessful or patient reports new pain  - Educate patient/family on pain management process including their role and importance of  reporting pain   - Provide non-pharmacologic/complimentary pain relief interventions  Outcome: Progressing     Problem: INFECTION - ADULT  Goal: Absence or prevention of progression during hospitalization  Description: INTERVENTIONS:  - Assess and monitor for signs and symptoms of infection  - Monitor lab/diagnostic results  - Monitor all insertion sites, i.e. indwelling lines, tubes, and drains  - Monitor endotracheal if appropriate and nasal secretions for changes in amount and color  - [place] appropriate cooling/warming therapies per order  - Administer medications as ordered  - Instruct and encourage patient and family to use good hand hygiene technique  - Identify and instruct in appropriate isolation precautions for identified infection/condition  Outcome: Progressing  Goal: Absence of fever/infection during neutropenic period  Description: INTERVENTIONS:  - Monitor WBC  - Perform strict hand hygiene  - Limit to healthy visitors only  - No plants, dried, fresh or silk flowers with ramirez in patient room  Outcome: Progressing     Problem: SAFETY ADULT  Goal: Patient will remain free of falls  Description: INTERVENTIONS:  - Educate patient/family on patient safety including physical limitations  - Instruct patient to call for assistance with activity   -  Consider consulting OT/PT to assist with strengthening/mobility based on AM PAC & JH-HLM score  - Consult OT/PT to assist with strengthening/mobility   - Keep Call bell within reach  - Keep bed low and locked with side rails adjusted as appropriate  - Keep care items and personal belongings within reach  - Initiate and maintain comfort rounds  - Make Fall Risk Sign visible to staff  - Offer Toileting every 2 Hours, in advance of need  - Initiate/Maintain alarm  - Obtain necessary fall risk management equipment  - Apply yellow socks and bracelet for high fall risk patients  - Consider moving patient to room near nurses station  Outcome: Progressing  Goal: Maintain or return to baseline ADL function  Description: INTERVENTIONS:  -  Assess patient's ability to carry out ADLs; assess patient's baseline for ADL function and identify physical deficits which impact ability to perform ADLs (bathing, care of mouth/teeth, toileting, grooming, dressing, etc.)  - Assess/evaluate cause of self-care deficits   - Assess range of motion  - Assess patient's mobility; develop plan if impaired  - Assess patient's need for assistive devices and provide as appropriate  - Encourage maximum independence but intervene and supervise when necessary  - Involve family in performance of ADLs  - Assess for home care needs following discharge   - Consider OT consult to assist with ADL evaluation and planning for discharge  - Provide patient education as appropriate  - Monitor functional capacity and physical performance, use of AM PAC & JH-HLM   - Monitor gait, balance and fatigue with ambulation    Outcome: Progressing  Goal: Maintains/Returns to pre admission functional level  Description: INTERVENTIONS:  - Perform AM-PAC 6 Click Basic Mobility/ Daily Activity assessment daily.  - Set and communicate daily mobility goal to care team and patient/family/caregiver.   - Collaborate with rehabilitation services on mobility goals if consulted  -  Perform Range of Motion 2 times a day.  - Reposition patient every 2 hours.  - Dangle patient 3 times a day  - Stand patient 2 times a day  - Ambulate patient 3 times a day  - Out of bed to chair 3 times a day   - Out of bed for meals 3 times a day  - Out of bed for toileting  - Record patient progress and toleration of activity level   Outcome: Progressing     Problem: DISCHARGE PLANNING  Goal: Discharge to home or other facility with appropriate resources  Description: INTERVENTIONS:  - Identify barriers to discharge w/patient and caregiver  - Arrange for needed discharge resources and transportation as appropriate  - Identify discharge learning needs (meds, wound care, etc.)  - Arrange for interpretive services to assist at discharge as needed  - Refer to Case Management Department for coordinating discharge planning if the patient needs post-hospital services based on physician/advanced practitioner order or complex needs related to functional status, cognitive ability, or social support system  Outcome: Progressing     Problem: Knowledge Deficit  Goal: Patient/family/caregiver demonstrates understanding of disease process, treatment plan, medications, and discharge instructions  Description: Complete learning assessment and assess knowledge base.  Interventions:  - Provide teaching at level of understanding  - Provide teaching via preferred learning methods  Outcome: Progressing

## 2025-07-01 NOTE — ANESTHESIA POSTPROCEDURE EVALUATION
Post-Op Assessment Note    CV Status:  Stable    Pain management: adequate       Mental Status:  Alert and awake   Hydration Status:  Euvolemic   PONV Controlled:  Controlled   Airway Patency:  Patent     Post Op Vitals Reviewed: Yes    No anethesia notable event occurred.    Staff: Anesthesiologist           Last Filed PACU Vitals:  Vitals Value Taken Time   Temp 99.1 °F (37.3 °C) 06/29/25 00:05   Pulse 79 06/29/25 00:52   /57 06/29/25 00:45   Resp 26 06/29/25 00:52   SpO2 97 % 06/29/25 00:52   Vitals shown include unfiled device data.    Modified Reagan:     Vitals Value Taken Time   Activity 2 06/29/25 00:45   Respiration 2 06/29/25 00:45   Circulation 2 06/29/25 00:45   Consciousness 2 06/29/25 00:45   Oxygen Saturation 2 06/29/25 00:45     Modified Reagan Score: 10

## 2025-07-01 NOTE — CASE MANAGEMENT
Case Management Discharge Planning Note    Patient name Iban Gao  Location [facility] 633-01 MRN [id number]  : 2000 Date 2025       Current Admission Date: 2025  Current Admission Diagnosis:Self-inflicted injury (HCC)   Patient Active Problem List    Diagnosis Date Noted    Psychosis (HCC) r/o schizoaffective d/o vs bipolar w/ psychosis 2025    Laceration of right forearm 2025    Laceration of left forearm 2025    Self-inflicted injury (HCC) 2025      LOS (days): 3  Geometric Mean LOS (GMLOS) (days):   Days to GMLOS:     OBJECTIVE:  Risk of Unplanned Readmission Score: 8.5         Current admission status: Inpatient   Preferred Pharmacy: No Pharmacies Listed  Primary Care Provider: Blaine Cotter MD    Primary Insurance:   Secondary Insurance:     DISCHARGE DETAILS:    PATHS referral placed, as the pt has no insurance    Pt was accepted to [facility] by Dr. STEVENS  Pt can d/c there today after  but will need a UDS prior to d/c  CM informed Primary team and pt's nurse    CM submitted for [facility] transport at that time

## 2025-07-01 NOTE — OP NOTE
OPERATIVE REPORT  PATIENT NAME: Iban Gao    :  2000  MRN: [id number]  Pt Location:  OR ROOM 04    SURGERY DATE: 2025    Surgeons and Role:     * Contreras Gandara MD - Primary     * Sugey Leong MD - Assisting    Preop Diagnosis:  Laceration of left forearm, initial encounter [S51.812A]    Post-Op Diagnosis Codes:     * Laceration of left forearm, initial encounter [S51.812A]    Procedure(s):  Left - DEBRIDEMENT UPPER EXTREMITY (WASH OUT)  Left - CLOSURE WOUND EXTREMITY    Specimen(s):  * No specimens in log *    Estimated Blood Loss:   Minimal    Drains:  * No LDAs found *    Anesthesia Type:   Choice    Operative Indications:  Laceration of left forearm, initial encounter [S51.296T]    Operative Findings:  Removal of wound VAC, 1 black sponge   Washout and closure of remaining open wound (5cm) with 2-0 Nylon suture.    Infection was not present at time of surgery.       Complications:   None    Procedure and Technique:  Patient was brought to the OR from the preoperative area with identity confirmed both verbally and by wristband.  Patient was transitioned to the OR table in supine position.  Patient induced and LMA placed by anesthesia.  Patient's left arm was placed out on an armboard and wound VAC was removed including 1 black sponge.  Patient's left upper extremity was prepped with Betadine and draped in routine sterile fashion.  Timeout was performed confirming patient, procedure, and all critical steps with all parties agreeable to proceed.    The open portion of the left lower extremity wound was then explored to evaluate for any hematoma, of which none was found.  Muscle belly was noted protruding slightly from the wound and appeared healthy and viable.  There was no active bleeding noted.  The wound was then irrigated with normal saline using Pulsavac.  Following irrigation wound appeared clean and hemostatic with healthy viable tissue at both the wound base and wound edges.  No  debridement was required. This open portion of the incision was approximately 5 cm in length.  The wound was then closed with interrupted vertical mattress sutures using 2-0 nylon.  The area was anesthetized with 10 cc 1% lidocaine then covered with Adaptic, 4 x 4 gauze, ABD pad and wrapped with Kerlix and Coban.  Arm remained soft and compressible following closure.  All counts correct x 2 at the conclusion of the case.      was present for the entire procedure.    Patient Disposition:  PACU  and extubated and stable         SIGNATURE: Sugey Leong MD  DATE: June 30, 2025  TIME: 9:24 PM

## 2025-07-01 NOTE — PROGRESS NOTES
Progress Note - Behavioral Health   Iban Gao 24 y.o. male MRN: [id number]  [facility] Encounter: [id number]          I saw the patient today as a follow up as part of continuation of care.      Assessment & Plan  Psychosis ([facility]) r/o schizoaffective d/o vs bipolar w/ psychosis  Iban Gao is a 24 y.o. male with no significant past medical history and past psychiatric history starting for psychotic and paranoid symptoms and bipolar disorder.  Patient is admitted to the hospital secondary to lacerations on his forearms that are self-inflicted in a suicide attempt.  Psychiatry sent to consult secondary to his suicidal ideation and being status post suicide attempt.    At this time patient appears to continue to be paranoid and exhibiting psychotic symptoms.  He does seem amenable for medications given further discussion today.  However should he refuse medications again tonight patient should be 302 would due to psychosis/paranoia causing him to not have capacity and insight to need for treatment.  Was informed by case management that primary team would be medically clearing the patient later today.  If this is the case bed search should be given once this is documented.    - Haldol 5 mg at bedtime  -Seroquel 50 mg at bedtime  -Please obtain baseline EKG to assess Qtc  - Social work to meet patient later today to sign 201 and start bed search when medically cleared  -Patient may not leave AMA.  If he expresses wanting to leave AMA or wanting to rescind his 201 he should be 302'd at that time.   -Should pt refuse meds again tonight, 302 is to be pursued  -Please see below for medications for acute agitation    #) Medications for Agitation  - Would recommend verbal de-escalation first for agitation. Please offer voluntary medications orally if able.  - Would recommend the following medications if necessary for pt's safety or safety of others (please hold for oversedation):         Haldol 5mg PO/IV/IM   "         Ativan 2mg PO/IV/IM         Cogentin 1mg PO/IV/IM vs    - Please consider EKG as appropriate to monitor for QTc prolongation (>450) specifically if medications are used more than one time or patient has received >1 antipsychotic medication.  - Monitor electrolyte imbalances w/ goal K >4.0, Mg >2.0      Self-inflicted injury (HCC)    Laceration of right forearm    Laceration of left forearm         Diagnoses, available treatment options, alternatives to treatment, and risks vs. benefits of current psychiatric treatment plan were discussed with the patient.  Prior records were reviewed in [facility].  The case was discussed with the primary team.    Scheduled medications:  Current Facility-Administered Medications   Medication Dose Route Frequency Provider Last Rate    acetaminophen  975 mg Oral Q6H JUSTIN Ojeda MD      enoxaparin  30 mg Subcutaneous Q12H JUSTIN Ojeda MD      haloperidol  5 mg Oral HS Sugey Leong MD      HYDROmorphone  0.5 mg Intravenous Q4H PRN Sguey Leong MD      hydrOXYzine HCL  100 mg Oral Q6H PRN Sugey Leong MD      methocarbamol  750 mg Oral Q6H JUSTIN Stefanie Ojeda MD      ondansetron  4 mg Intravenous Q4H PRN Stefanie Ojeda MD      oxyCODONE  5 mg Oral Q4H PRN Sugey Leong MD      oxyCODONE  2.5 mg Oral Q4H PRN Sugey Leong MD      QUEtiapine  50 mg Oral HS Sugey Leong MD          PRN:    HYDROmorphone    hydrOXYzine HCL    ondansetron    oxyCODONE    oxyCODONE      Subjective:  Patient seen this morning in his room.  He continues to appear depressed.  Continues to note depression and anxiety.  Continues to endorse paranoia stating this is the reason why he was not compliant with his medications stating \"I think that you are all part of it to\".  After discussion with the patient he does express being amenable to taking medications later today to help with his paranoia and psychosis.  Informed patient that should he refuse medications again we would be " "pursuing 302 at that time.  Patient expresses understanding.  He denies any SI or HI or AVH at this time but does continue to endorse VH of \"orbs\".    Was informed this morning that patient is medically cleared.    Vitals  /57 (BP Location: Right leg)   Pulse 80   Temp 98 °F (36.7 °C) (Oral)   Resp 16   Ht 6' 1\" (1.854 m)   Wt 81.2 kg (179 lb 0.2 oz)   SpO2 100%   BMI 23.62 kg/m²     Labs:   I have personally reviewed all pertinent laboratory/tests results.    EKG  Encounter Date: 06/28/25   ECG 12 lead   Result Value    Ventricular Rate 66    Atrial Rate 66    FL Interval 150    QRSD Interval 94    QT Interval 408    QTC Interval 428    P Axis 268    QRS Axis 252    T Wave Axis 251    Narrative    Unusual P axis, possible ectopic atrial rhythm  Right superior axis deviation  Marked ST abnormality, possible inferior subendocardial injury  Abnormal ECG  When compared with ECG of 01-Jul-2025 06:29, (unconfirmed)  Premature ventricular complexes are no longer Present  Confirmed by Gregory Lynch ([id number]) on 7/1/2025 9:24:02 AM       ROS: all other systems are negative    Mental Status Evaluation:  Appearance: disheveled, appears consistent with stated age  Motor: no psychomotor disturbances, no gait abnormalities  Behavior: superficially cooperative, disorganized  Speech: normal rate and rhythm  Mood: \"bad\"  Affect: Dysphoric  Thought Process: [place]  Thought Content: Endorses delusions and paranoia regarding people being out to harm him  Risk Potential: denies suicidal ideation, plan, or intent. Denies homicidal ideation  Perceptions: +IP/-RIS, denies auditory hallucinations, endorses visual hallucinations of \"orbs\"  Sensorium: Oriented to person, place, time, and situation  Cognition: cognitive ability appears intact but was not quantitatively tested  Consciousness: alert and awake  Attention: intact  Insight: limited  Judgement: limited    Counseling / Coordination of Care  I have spent a total time of " 30 minutes on 07/01/25 in caring for this patient including Reviewing patient's chart, Interviewing patient, Discussing with the primary team, and Documenting in the medical record    Alexis Crawford MD

## 2025-07-01 NOTE — PROGRESS NOTES
Progress Note - Surgery-General   Name: Iban Gao 24 y.o. male I MRN: [id number]  Unit/Bed#: [facility] 633-01 I Date of Admission: 6/28/2025   Date of Service: 7/1/2025 I Hospital Day: 3     Assessment & Plan  Self-inflicted injury (HCC)  Laceration of right forearm  Laceration of left forearm  - Bilateral upper extremity washout, primary closure of right laceration, partial closure of left laceration with wound vac placement 6/28  -6/30 left arm washout & closure    - Diet as tolerated  - PT/OT  - appreciate CM  - start DVT ppx  Psychosis (HCC) r/o schizoaffective d/o vs bipolar w/ psychosis      Subjective/Objective   NAOE. Pain controlled. Tolerating diet, no n/v. Voiding. Walking.    Physical Exam:  General: NAD  CV: nl rate  Lungs: nl wob. No resp distress.  ABD: Soft, ND, NT  Extrem: B/l UE motor/sensory intact though weak, radial pulses palpable, tender, dressings CDI  UOP unmeasured    Patient Vitals for the past 24 hrs:   BP Temp Temp src Pulse Resp SpO2   07/01/25 0328 130/74 98 °F (36.7 °C) Oral 75 16 95 %   07/01/25 0226 127/70 97.9 °F (36.6 °C) Oral -- -- --   07/01/25 0100 128/69 -- -- -- 16 95 %   06/30/25 2256 143/84 -- -- -- -- --   06/30/25 2222 162/71 -- -- 94 16 95 %   06/30/25 2220 -- -- -- (!) 121 -- 99 %   06/30/25 2200 134/60 -- -- 75 16 95 %   06/30/25 2145 134/60 -- -- 76 18 97 %   06/30/25 2130 131/65 -- -- 81 16 97 %   06/30/25 2125 131/69 (!) 97.4 °F (36.3 °C) Temporal 86 18 97 %   06/30/25 1823 -- -- -- 93 -- 96 %   06/30/25 1406 -- -- -- 84 -- 96 %   06/30/25 1000 -- -- -- -- -- 94 %   06/30/25 0708 137/69 97.9 °F (36.6 °C) Oral 69 16 96 %       I/O         06/29 0701 06/30 0700 06/30 0701 07/01 0700    P.O. 120 480    I.V. (mL/kg)  500 (6.2)    Total Intake(mL/kg) 120 (1.5) 980 (12.1)    Urine (mL/kg/hr)  0 (0)    Drains  50    Total Output  50    Net +120 +930          Unmeasured Urine Occurrence 2 x             Recent Labs     06/29/25  0457 06/30/25  0622 06/30/25  1236   WBC  "15.97*  --  8.85   HGB 13.3  --  13.3     --  228   SODIUM 137 138  --    K 3.9 3.7  --     104  --    CO2 21 25  --    BUN 12 9  --    CREATININE 0.99 0.90  --    GLUC 105 90  --    CALCIUM 8.4 8.6  --    EGFR 104 117  --      No results found for: \"BLOODCX\", \"WOUNDCULT\", \"URINECX\", \"LEUKOCYTESUR\"    "

## 2025-07-01 NOTE — PLAN OF CARE
Problem: PAIN - ADULT  Goal: Verbalizes/displays adequate comfort level or baseline comfort level  Description: Interventions:  - Encourage patient to monitor pain and request assistance  - Assess pain using appropriate pain scale  - Administer analgesics as ordered based on type and severity of pain and evaluate response  - Implement non-pharmacological measures as appropriate and evaluate response  - Consider cultural and social influences on pain and pain management  - Notify physician/advanced practitioner if interventions unsuccessful or patient reports new pain  - Educate patient/family on pain management process including their role and importance of  reporting pain   - Provide non-pharmacologic/complimentary pain relief interventions  Outcome: Progressing     Problem: INFECTION - ADULT  Goal: Absence or prevention of progression during hospitalization  Description: INTERVENTIONS:  - Assess and monitor for signs and symptoms of infection  - Monitor lab/diagnostic results  - Monitor all insertion sites, i.e. indwelling lines, tubes, and drains  - Monitor endotracheal if appropriate and nasal secretions for changes in amount and color  - [place] appropriate cooling/warming therapies per order  - Administer medications as ordered  - Instruct and encourage patient and family to use good hand hygiene technique  - Identify and instruct in appropriate isolation precautions for identified infection/condition  Outcome: Progressing  Goal: Absence of fever/infection during neutropenic period  Description: INTERVENTIONS:  - Monitor WBC  - Perform strict hand hygiene  - Limit to healthy visitors only  - No plants, dried, fresh or silk flowers with ramirez in patient room  Outcome: Progressing     Problem: SAFETY ADULT  Goal: Patient will remain free of falls  Description: INTERVENTIONS:  - Educate patient/family on patient safety including physical limitations  - Instruct patient to call for assistance with activity   -  Consider consulting OT/PT to assist with strengthening/mobility based on AM PAC & JH-HLM score  - Consult OT/PT to assist with strengthening/mobility   - Keep Call bell within reach  - Keep bed low and locked with side rails adjusted as appropriate  - Keep care items and personal belongings within reach  - Initiate and maintain comfort rounds  - Make Fall Risk Sign visible to staff  - Offer Toileting every 2 Hours, in advance of need  - Initiate/Maintain bed alarm  - Obtain necessary fall risk management equipment:    - Apply yellow socks and bracelet for high fall risk patients  - Consider moving patient to room near nurses station  Outcome: Progressing  Goal: Maintain or return to baseline ADL function  Description: INTERVENTIONS:  -  Assess patient's ability to carry out ADLs; assess patient's baseline for ADL function and identify physical deficits which impact ability to perform ADLs (bathing, care of mouth/teeth, toileting, grooming, dressing, etc.)  - Assess/evaluate cause of self-care deficits   - Assess range of motion  - Assess patient's mobility; develop plan if impaired  - Assess patient's need for assistive devices and provide as appropriate  - Encourage maximum independence but intervene and supervise when necessary  - Involve family in performance of ADLs  - Assess for home care needs following discharge   - Consider OT consult to assist with ADL evaluation and planning for discharge  - Provide patient education as appropriate  - Monitor functional capacity and physical performance, use of AM PAC & JH-HLM   - Monitor gait, balance and fatigue with ambulation    Outcome: Progressing  Goal: Maintains/Returns to pre admission functional level  Description: INTERVENTIONS:  - Perform AM-PAC 6 Click Basic Mobility/ Daily Activity assessment daily.  - Set and communicate daily mobility goal to care team and patient/family/caregiver.   - Collaborate with rehabilitation services on mobility goals if  consulted  - Perform Range of Motion 3 times a day.  - Reposition patient every 2 hours.  - Dangle patient 3 times a day  - Stand patient 3 times a day  - Ambulate patient 3 times a day  - Out of bed to chair 3 times a day   - Out of bed for meals 3 times a day  - Out of bed for toileting  - Record patient progress and toleration of activity level   Outcome: Progressing     Problem: DISCHARGE PLANNING  Goal: Discharge to home or other facility with appropriate resources  Description: INTERVENTIONS:  - Identify barriers to discharge w/patient and caregiver  - Arrange for needed discharge resources and transportation as appropriate  - Identify discharge learning needs (meds, wound care, etc.)  - Arrange for interpretive services to assist at discharge as needed  - Refer to Case Management Department for coordinating discharge planning if the patient needs post-hospital services based on physician/advanced practitioner order or complex needs related to functional status, cognitive ability, or social support system  Outcome: Progressing     Problem: Knowledge Deficit  Goal: Patient/family/caregiver demonstrates understanding of disease process, treatment plan, medications, and discharge instructions  Description: Complete learning assessment and assess knowledge base.  Interventions:  - Provide teaching at level of understanding  - Provide teaching via preferred learning methods  Outcome: Progressing

## 2025-07-01 NOTE — ASSESSMENT & PLAN NOTE
Bilateral Forearm lacerations sustained in self-harm event   8-10 cm on each arm w/o appreciated Nerve damage  Tendons visible but not damaged  OR washout and closure 6/28  R Arm Closed fully, L Arm Wound vac  6/30 washout and closure of L arm wound   Continue frequent neurovascular checks  Multimodal pain control  1:1 observation  Psych consultation  -inpatient behavioral health after medically cleared for DC  Haldol and seroquel ordered per psych recommendations

## 2025-07-01 NOTE — ASSESSMENT & PLAN NOTE
Iban Gao is a 24 y.o. male with no significant past medical history and past psychiatric history starting for psychotic and paranoid symptoms and bipolar disorder.  Patient is admitted to the hospital secondary to lacerations on his forearms that are self-inflicted in a suicide attempt.  Psychiatry sent to consult secondary to his suicidal ideation and being status post suicide attempt.    At this time patient appears to continue to be paranoid and exhibiting psychotic symptoms.  He does seem amenable for medications given further discussion today.  However should he refuse medications again tonight patient should be 302 would due to psychosis/paranoia causing him to not have capacity and insight to need for treatment.  Was informed by case management that primary team would be medically clearing the patient later today.  If this is the case bed search should be given once this is documented.    - Haldol 5 mg at bedtime  -Seroquel 50 mg at bedtime  -Please obtain baseline EKG to assess Qtc  - Social work to meet patient later today to sign 201 and start bed search when medically cleared  -Patient may not leave AMA.  If he expresses wanting to leave AMA or wanting to rescind his 201 he should be 302'd at that time.   -Should pt refuse meds again tonight, 302 is to be pursued  -Please see below for medications for acute agitation    #) Medications for Agitation  - Would recommend verbal de-escalation first for agitation. Please offer voluntary medications orally if able.  - Would recommend the following medications if necessary for pt's safety or safety of others (please hold for oversedation):         Haldol 5mg PO/IV/IM           Ativan 2mg PO/IV/IM         Cogentin 1mg PO/IV/IM vs    - Please consider EKG as appropriate to monitor for QTc prolongation (>450) specifically if medications are used more than one time or patient has received >1 antipsychotic medication.  - Monitor electrolyte imbalances w/ goal K  >4.0, Mg >2.0

## 2025-07-01 NOTE — ASSESSMENT & PLAN NOTE
- Bilateral upper extremity washout, primary closure of right laceration, partial closure of left laceration with wound vac placement 6/28  -6/30 left arm washout & closure    - Diet as tolerated  - PT/OT  - appreciate CM  - start DVT ppx

## 2025-07-01 NOTE — DISCHARGE SUMMARY
"Discharge Summary - Trauma   Name: Iban Gao 24 y.o. male I MRN: [id number]  Unit/Bed#: [facility] 633-01 I Date of Admission: 6/28/2025   Date of Service: 7/1/2025 I Hospital Day: 3    Admission Date: 6/28/2025 2133  Discharge Date: 07/01/25  Admitting Diagnosis: Unspecified multiple injuries, initial encounter [T07.XXXA]  Discharge Diagnosis:   Medical Problems       Resolved Problems  Date Reviewed: 7/1/2025   None         HPI: Documented by Chavez Bosch MD on 6/28/2025, \"Iban Gao is a 24 y.o. male who presents after a suicide attempt to cut bilateral arms with a pocket knife.  The patient created 8 to 10 cm lacerations in both of his arms in an attempt to kill himself.  The patient does not appear to have any nerve involvement at this time.  Patient able to conduct radial ulnar and median nerve hand movements.  Radial pulses 2+ bilaterally.  Due to the extent of the injuries the patient will go to the operating room for OR washout and closure.\"    Procedures Performed: No orders of the defined types were placed in this encounter.      Summary of Hospital Course: The patient was seen and evaluated by the trauma team after the reported suicide attempt with bilateral upper extremity lacerations.  He was taken to the OR on admission by the general surgery team for bilateral upper extremity debridements with primary closure of the right arm wound and partial closure of the left arm wound with VAC placement.  He then returned to the OR on 6/30/2025 for washout and closure of the left upper extremity wound.  During admission, his hemoglobin was monitored and his pain was well-managed.  Psychiatry evaluated the patient and he ultimately signed a 201 for inpatient psychiatric treatment.  The patient was stable for discharge to inpatient psych on 7/1/2025.  He will follow-up as an outpatient with the general surgery clinic for reevaluation and wound check.  For further details and hospitalization, please reference " hospital medical chart.    Significant Findings, Care, Treatment and Services Provided: Bilateral upper extremity wounds requiring surgical washout and closure    Complications: None    Condition at Discharge: good       Discharge instructions/Information to patient and family:   See After Visit Summary (AVS) for information provided to patient and family.      Provisions for Follow-Up Care:  See after visit summary for information related to follow-up care and any pertinent home health orders.      PCP: Blaine Cotter MD    Disposition: Inpatient psychiatric facility    Planned Readmission: Yes [facility]  Future Encounters        7/10/2025 11:00 AM (15 min) [place]    POST OP PG    GEN SURG [facility] ([facility])    GEN SURG GENERIC PHYSICIAN [facility]ORI                            Discharge Medications:  See after visit summary for reconciled discharge medications provided to patient and family.      Discharge Statement:  I have spent a total time of 29 minutes in caring for this patient on the day of the visit/encounter.

## 2025-07-01 NOTE — CASE MANAGEMENT
Case Management Discharge Planning Note    Patient name Iban Gao  Location [facility] 633/[facility] 633-01 MRN [id number]  : 2000 Date 2025       Current Admission Date: 2025  Current Admission Diagnosis:Self-inflicted injury (HCC)   Patient Active Problem List    Diagnosis Date Noted    Psychosis (HCC) r/o schizoaffective d/o vs bipolar w/ psychosis 2025    Laceration of right forearm 2025    Laceration of left forearm 2025    Self-inflicted injury (HCC) 2025      LOS (days): 3  Geometric Mean LOS (GMLOS) (days):   Days to GMLOS:     OBJECTIVE:  Risk of Unplanned Readmission Score: 8.5         Current admission status: Inpatient   Preferred Pharmacy: No Pharmacies Listed  Primary Care Provider: Blaine Cotter MD    Primary Insurance:   Secondary Insurance:     DISCHARGE DETAILS:    CM met with pt to complete 201  Pt signed paperwork. CM faxed to [facility] and will follow up       DISPLAY PLAN FREE TEXT

## 2025-07-01 NOTE — PROGRESS NOTES
Progress Note - Trauma   Name: Iban Gao 24 y.o. male I MRN: [id number]  Unit/Bed#: [facility] 633-01 I Date of Admission: 6/28/2025   Date of Service: 7/1/2025 I Hospital Day: 3     Assessment & Plan  Self-inflicted injury (HCC)  Bilateral Forearm lacerations sustained in self-harm event   8-10 cm on each arm w/o appreciated Nerve damage  Tendons visible but not damaged  OR washout and closure 6/28  R Arm Closed fully, L Arm Wound vac  6/30 washout and closure of L arm wound   Continue frequent neurovascular checks  Multimodal pain control  1:1 observation  Psych consultation  -inpatient behavioral health after medically cleared for DC  Haldol and seroquel ordered per psych recommendations  Psychosis (HCC) r/o schizoaffective d/o vs bipolar w/ psychosis  Psych following  Will require 201 or 302  Inpatient psych at discharge    Bowel Regimen: n/a  VTE Prophylaxis:Sequential compression device (Venodyne)  and Enoxaparin (Lovenox)     Disposition: medically stable for discharge to rehab facility     24 Hour Events : LUE closure done in OR yesterday. Haldol and seroquel started by psych. Plan for dressing down today per gen surg, stable for discharge to rehab facility  Subjective : no acute events overnight. Reports pain controlled.     Objective :  Temp:  [97.4 °F (36.3 °C)-98 °F (36.7 °C)] 97.5 °F (36.4 °C)  HR:  [] 68  BP: (127-162)/(60-84) 134/75  Resp:  [16-18] 17  SpO2:  [94 %-100 %] 100 %  O2 Device: None (Room air)    I/O         06/29 0701 06/30 0700 06/30 0701  07/01 0700 07/01 0701  07/02 0700    P.O. 120 480     I.V. (mL/kg)  500 (6.2)     Total Intake(mL/kg) 120 (1.5) 980 (12.1)     Urine (mL/kg/hr)  0 (0)     Drains  50     Total Output  50     Net +120 +930            Unmeasured Urine Occurrence 2 x              Physical Exam:  General: No acute distress, alert   CV: Well perfused, regular rate and rhythm  Lungs: Normal work of breathing, no increased respiratory effort  Abdomen: Soft, non-tender,  non-distended  Extremities: bilateral upper extremities dressed, motor/sensory intact. Palpable pulses bilaterally   Skin: Warm, dry           Lab Results: I have reviewed the following results:  Recent Labs     06/28/25  2145 06/29/25  0457 06/29/25 0457 06/30/25  0622 06/30/25  1236   WBC  --  15.97*   < >  --  8.85   HGB 16.0 13.3   < >  --  13.3   HCT 47 38.9   < >  --  38.9   PLT  --  236   < >  --  228   BANDSPCT  --  2  --   --   --    SODIUM  --  137   < > 138  --    K  --  3.9   < > 3.7  --    CL  --  102   < > 104  --    CO2 21 21   < > 25  --    BUN  --  12   < > 9  --    CREATININE  --  0.99   < > 0.90  --    GLUC  --  105   < > 90  --    CAIONIZED 1.08*  --   --   --   --     < > = values in this interval not displayed.

## 2025-07-02 PROBLEM — Z00.8 MEDICAL CLEARANCE FOR PSYCHIATRIC ADMISSION: Status: ACTIVE | Noted: 2025-07-02

## 2025-07-02 PROBLEM — F25.0 SCHIZOAFFECTIVE DISORDER, BIPOLAR TYPE (HCC): Status: ACTIVE | Noted: 2025-07-02

## 2025-07-02 PROBLEM — F25.0 SCHIZOAFFECTIVE DISORDER, BIPOLAR TYPE (HCC): Chronic | Status: ACTIVE | Noted: 2025-07-02

## 2025-07-02 LAB
ATRIAL RATE: 62 BPM
P AXIS: 50 DEGREES
PR INTERVAL: 164 MS
QRS AXIS: 68 DEGREES
QRSD INTERVAL: 92 MS
QT INTERVAL: 424 MS
QTC INTERVAL: 430 MS
T WAVE AXIS: 41 DEGREES
VENTRICULAR RATE: 62 BPM

## 2025-07-02 PROCEDURE — 99223 1ST HOSP IP/OBS HIGH 75: CPT | Performed by: STUDENT IN AN ORGANIZED HEALTH CARE EDUCATION/TRAINING PROGRAM

## 2025-07-02 PROCEDURE — 93010 ELECTROCARDIOGRAM REPORT: CPT | Performed by: INTERNAL MEDICINE

## 2025-07-02 PROCEDURE — 99253 IP/OBS CNSLTJ NEW/EST LOW 45: CPT | Performed by: PHYSICIAN ASSISTANT

## 2025-07-02 PROCEDURE — 93005 ELECTROCARDIOGRAM TRACING: CPT

## 2025-07-02 RX ORDER — HALOPERIDOL 5 MG/1
5 TABLET ORAL DAILY
Status: DISCONTINUED | OUTPATIENT
Start: 2025-07-03 | End: 2025-07-05

## 2025-07-02 RX ADMIN — METHOCARBAMOL TABLETS 750 MG: 500 TABLET, COATED ORAL at 23:44

## 2025-07-02 RX ADMIN — ACETAMINOPHEN 975 MG: 325 TABLET, FILM COATED ORAL at 18:13

## 2025-07-02 RX ADMIN — METHOCARBAMOL TABLETS 750 MG: 500 TABLET, COATED ORAL at 18:13

## 2025-07-02 RX ADMIN — IBUPROFEN 800 MG: 800 TABLET, FILM COATED ORAL at 13:29

## 2025-07-02 RX ADMIN — HALOPERIDOL 5 MG: 5 TABLET ORAL at 21:20

## 2025-07-02 RX ADMIN — ACETAMINOPHEN 975 MG: 325 TABLET, FILM COATED ORAL at 06:13

## 2025-07-02 RX ADMIN — QUETIAPINE FUMARATE 50 MG: 25 TABLET ORAL at 21:20

## 2025-07-02 RX ADMIN — ACETAMINOPHEN 975 MG: 325 TABLET, FILM COATED ORAL at 23:44

## 2025-07-02 RX ADMIN — ACETAMINOPHEN 975 MG: 325 TABLET, FILM COATED ORAL at 12:07

## 2025-07-02 RX ADMIN — GABAPENTIN 100 MG: 100 CAPSULE ORAL at 21:20

## 2025-07-02 RX ADMIN — METHOCARBAMOL TABLETS 750 MG: 500 TABLET, COATED ORAL at 12:07

## 2025-07-02 RX ADMIN — METHOCARBAMOL TABLETS 750 MG: 500 TABLET, COATED ORAL at 06:14

## 2025-07-02 RX ADMIN — GABAPENTIN 100 MG: 100 CAPSULE ORAL at 08:51

## 2025-07-02 RX ADMIN — GABAPENTIN 100 MG: 100 CAPSULE ORAL at 16:20

## 2025-07-02 NOTE — ANESTHESIA POSTPROCEDURE EVALUATION
Post-Op Assessment Note    CV Status:  Stable  Pain Score: 0    Pain management: adequate       Mental Status:  Awake and sleepy   Hydration Status:  Stable   PONV Controlled:  None   Airway Patency:  Patent     Post Op Vitals Reviewed: Yes    No anethesia notable event occurred.    Staff: Anesthesiologist           Last Filed PACU Vitals:  Vitals Value Taken Time   Temp 97.4 °F (36.3 °C) 06/30/25 21:25   Pulse 114 06/30/25 22:05   /80 06/30/25 22:01   Resp 34 06/30/25 22:04   SpO2 99 % 06/30/25 22:05   Vitals shown include unfiled device data.    Modified Reagan:     Vitals Value Taken Time   Activity 2 06/30/25 22:00   Respiration 2 06/30/25 22:00   Circulation 2 06/30/25 22:00   Consciousness 1 06/30/25 22:00   Oxygen Saturation 2 06/30/25 22:00     Modified Reagan Score: 9

## 2025-07-03 PROCEDURE — 99233 SBSQ HOSP IP/OBS HIGH 50: CPT | Performed by: PHYSICIAN ASSISTANT

## 2025-07-03 RX ORDER — GABAPENTIN 100 MG/1
200 CAPSULE ORAL 3 TIMES DAILY
Status: DISCONTINUED | OUTPATIENT
Start: 2025-07-03 | End: 2025-07-05

## 2025-07-03 RX ORDER — ONDANSETRON 4 MG/1
4 TABLET, ORALLY DISINTEGRATING ORAL EVERY 6 HOURS PRN
Status: DISCONTINUED | OUTPATIENT
Start: 2025-07-03 | End: 2025-08-04 | Stop reason: HOSPADM

## 2025-07-03 RX ADMIN — ACETAMINOPHEN 975 MG: 325 TABLET, FILM COATED ORAL at 05:53

## 2025-07-03 RX ADMIN — ACETAMINOPHEN 975 MG: 325 TABLET, FILM COATED ORAL at 12:16

## 2025-07-03 RX ADMIN — IBUPROFEN 400 MG: 400 TABLET, FILM COATED ORAL at 21:33

## 2025-07-03 RX ADMIN — ONDANSETRON 4 MG: 4 TABLET, ORALLY DISINTEGRATING ORAL at 21:34

## 2025-07-03 RX ADMIN — BENZTROPINE MESYLATE 1 MG: 1 TABLET ORAL at 15:54

## 2025-07-03 RX ADMIN — ONDANSETRON 4 MG: 4 TABLET, ORALLY DISINTEGRATING ORAL at 15:54

## 2025-07-03 RX ADMIN — GABAPENTIN 200 MG: 100 CAPSULE ORAL at 21:29

## 2025-07-03 RX ADMIN — GABAPENTIN 200 MG: 100 CAPSULE ORAL at 15:51

## 2025-07-03 RX ADMIN — HALOPERIDOL 5 MG: 5 TABLET ORAL at 21:30

## 2025-07-03 RX ADMIN — METHOCARBAMOL TABLETS 750 MG: 500 TABLET, COATED ORAL at 12:16

## 2025-07-03 RX ADMIN — METHOCARBAMOL TABLETS 750 MG: 500 TABLET, COATED ORAL at 05:54

## 2025-07-03 RX ADMIN — IBUPROFEN 800 MG: 800 TABLET, FILM COATED ORAL at 10:20

## 2025-07-03 RX ADMIN — BENZTROPINE MESYLATE 1 MG: 1 TABLET ORAL at 21:33

## 2025-07-03 RX ADMIN — GABAPENTIN 100 MG: 100 CAPSULE ORAL at 09:21

## 2025-07-03 RX ADMIN — ONDANSETRON 4 MG: 4 TABLET, ORALLY DISINTEGRATING ORAL at 10:20

## 2025-07-03 RX ADMIN — QUETIAPINE FUMARATE 50 MG: 25 TABLET ORAL at 21:29

## 2025-07-03 RX ADMIN — METHOCARBAMOL TABLETS 750 MG: 500 TABLET, COATED ORAL at 17:19

## 2025-07-03 RX ADMIN — HYDROXYZINE HYDROCHLORIDE 100 MG: 50 TABLET ORAL at 10:20

## 2025-07-03 RX ADMIN — ACETAMINOPHEN 975 MG: 325 TABLET, FILM COATED ORAL at 18:39

## 2025-07-03 RX ADMIN — HALOPERIDOL 5 MG: 5 TABLET ORAL at 09:20

## 2025-07-04 PROCEDURE — 99232 SBSQ HOSP IP/OBS MODERATE 35: CPT | Performed by: PSYCHIATRY & NEUROLOGY

## 2025-07-04 RX ORDER — BENZTROPINE MESYLATE 0.5 MG/1
0.5 TABLET ORAL 2 TIMES DAILY
Status: DISCONTINUED | OUTPATIENT
Start: 2025-07-04 | End: 2025-07-05

## 2025-07-04 RX ORDER — AMOXICILLIN 250 MG
2 CAPSULE ORAL
Status: DISCONTINUED | OUTPATIENT
Start: 2025-07-04 | End: 2025-07-24

## 2025-07-04 RX ADMIN — QUETIAPINE FUMARATE 50 MG: 25 TABLET ORAL at 21:31

## 2025-07-04 RX ADMIN — GABAPENTIN 200 MG: 100 CAPSULE ORAL at 21:31

## 2025-07-04 RX ADMIN — HALOPERIDOL 5 MG: 5 TABLET ORAL at 21:30

## 2025-07-04 RX ADMIN — METHOCARBAMOL TABLETS 750 MG: 500 TABLET, COATED ORAL at 00:22

## 2025-07-04 RX ADMIN — IBUPROFEN 400 MG: 400 TABLET, FILM COATED ORAL at 11:28

## 2025-07-04 RX ADMIN — METHOCARBAMOL TABLETS 750 MG: 500 TABLET, COATED ORAL at 17:28

## 2025-07-04 RX ADMIN — GABAPENTIN 200 MG: 100 CAPSULE ORAL at 16:15

## 2025-07-04 RX ADMIN — ACETAMINOPHEN 975 MG: 325 TABLET, FILM COATED ORAL at 06:00

## 2025-07-04 RX ADMIN — BENZTROPINE MESYLATE 0.5 MG: 0.5 TABLET ORAL at 21:31

## 2025-07-04 RX ADMIN — IBUPROFEN 600 MG: 600 TABLET ORAL at 18:54

## 2025-07-04 RX ADMIN — GABAPENTIN 200 MG: 100 CAPSULE ORAL at 08:56

## 2025-07-04 RX ADMIN — ACETAMINOPHEN 975 MG: 325 TABLET, FILM COATED ORAL at 00:25

## 2025-07-04 RX ADMIN — MAGNESIUM HYDROXIDE 30 ML: 400 SUSPENSION ORAL at 17:29

## 2025-07-04 RX ADMIN — ACETAMINOPHEN 975 MG: 325 TABLET, FILM COATED ORAL at 12:21

## 2025-07-04 RX ADMIN — SENNOSIDES AND DOCUSATE SODIUM 2 TABLET: 50; 8.6 TABLET ORAL at 21:31

## 2025-07-04 RX ADMIN — HALOPERIDOL 5 MG: 5 TABLET ORAL at 08:56

## 2025-07-04 RX ADMIN — HYDROXYZINE HYDROCHLORIDE 100 MG: 50 TABLET ORAL at 11:28

## 2025-07-04 RX ADMIN — METHOCARBAMOL TABLETS 750 MG: 500 TABLET, COATED ORAL at 06:01

## 2025-07-04 RX ADMIN — BENZTROPINE MESYLATE 1 MG: 1 TABLET ORAL at 08:56

## 2025-07-04 RX ADMIN — METHOCARBAMOL TABLETS 750 MG: 500 TABLET, COATED ORAL at 12:21

## 2025-07-05 LAB
BASOPHILS # BLD AUTO: 0.04 THOUSANDS/ÂΜL (ref 0–0.1)
BASOPHILS NFR BLD AUTO: 1 % (ref 0–1)
EOSINOPHIL # BLD AUTO: 0.45 THOUSAND/ÂΜL (ref 0–0.61)
EOSINOPHIL NFR BLD AUTO: 7 % (ref 0–6)
ERYTHROCYTE [DISTWIDTH] IN BLOOD BY AUTOMATED COUNT: 12.5 % (ref 11.6–15.1)
EST. AVERAGE GLUCOSE BLD GHB EST-MCNC: 105 MG/DL
HBA1C MFR BLD: 5.3 %
HCT VFR BLD AUTO: 37.2 % (ref 36.5–49.3)
HGB BLD-MCNC: 12.7 G/DL (ref 12–17)
IMM GRANULOCYTES # BLD AUTO: 0.03 THOUSAND/UL (ref 0–0.2)
IMM GRANULOCYTES NFR BLD AUTO: 1 % (ref 0–2)
LYMPHOCYTES # BLD AUTO: 2.38 THOUSANDS/ÂΜL (ref 0.6–4.47)
LYMPHOCYTES NFR BLD AUTO: 36 % (ref 14–44)
MCH RBC QN AUTO: 30.7 PG (ref 26.8–34.3)
MCHC RBC AUTO-ENTMCNC: 34.1 G/DL (ref 31.4–37.4)
MCV RBC AUTO: 90 FL (ref 82–98)
MONOCYTES # BLD AUTO: 0.45 THOUSAND/ÂΜL (ref 0.17–1.22)
MONOCYTES NFR BLD AUTO: 7 % (ref 4–12)
NEUTROPHILS # BLD AUTO: 3.25 THOUSANDS/ÂΜL (ref 1.85–7.62)
NEUTS SEG NFR BLD AUTO: 48 % (ref 43–75)
NRBC BLD AUTO-RTO: 0 /100 WBCS
PLATELET # BLD AUTO: 292 THOUSANDS/UL (ref 149–390)
PMV BLD AUTO: 9.8 FL (ref 8.9–12.7)
RBC # BLD AUTO: 4.14 MILLION/UL (ref 3.88–5.62)
WBC # BLD AUTO: 6.6 THOUSAND/UL (ref 4.31–10.16)

## 2025-07-05 PROCEDURE — 85025 COMPLETE CBC W/AUTO DIFF WBC: CPT | Performed by: PHYSICIAN ASSISTANT

## 2025-07-05 PROCEDURE — 99232 SBSQ HOSP IP/OBS MODERATE 35: CPT | Performed by: PSYCHIATRY & NEUROLOGY

## 2025-07-05 PROCEDURE — 83036 HEMOGLOBIN GLYCOSYLATED A1C: CPT | Performed by: PHYSICIAN ASSISTANT

## 2025-07-05 RX ORDER — QUETIAPINE FUMARATE 200 MG/1
200 TABLET, FILM COATED ORAL
Status: DISCONTINUED | OUTPATIENT
Start: 2025-07-05 | End: 2025-07-06

## 2025-07-05 RX ORDER — GABAPENTIN 300 MG/1
300 CAPSULE ORAL 3 TIMES DAILY
Status: DISCONTINUED | OUTPATIENT
Start: 2025-07-05 | End: 2025-07-31

## 2025-07-05 RX ADMIN — SENNOSIDES AND DOCUSATE SODIUM 2 TABLET: 50; 8.6 TABLET ORAL at 21:19

## 2025-07-05 RX ADMIN — BENZTROPINE MESYLATE 1 MG: 1 TABLET ORAL at 05:56

## 2025-07-05 RX ADMIN — PROPRANOLOL HYDROCHLORIDE 10 MG: 10 TABLET ORAL at 05:56

## 2025-07-05 RX ADMIN — ACETAMINOPHEN 975 MG: 325 TABLET, FILM COATED ORAL at 23:03

## 2025-07-05 RX ADMIN — IBUPROFEN 600 MG: 600 TABLET ORAL at 21:20

## 2025-07-05 RX ADMIN — GABAPENTIN 200 MG: 100 CAPSULE ORAL at 08:01

## 2025-07-05 RX ADMIN — ACETAMINOPHEN 975 MG: 325 TABLET, FILM COATED ORAL at 17:47

## 2025-07-05 RX ADMIN — GABAPENTIN 300 MG: 300 CAPSULE ORAL at 16:43

## 2025-07-05 RX ADMIN — GABAPENTIN 300 MG: 300 CAPSULE ORAL at 21:19

## 2025-07-05 RX ADMIN — QUETIAPINE FUMARATE 200 MG: 200 TABLET ORAL at 21:19

## 2025-07-05 RX ADMIN — METHOCARBAMOL TABLETS 750 MG: 500 TABLET, COATED ORAL at 05:14

## 2025-07-05 RX ADMIN — METHOCARBAMOL TABLETS 750 MG: 500 TABLET, COATED ORAL at 23:03

## 2025-07-05 RX ADMIN — ACETAMINOPHEN 975 MG: 325 TABLET, FILM COATED ORAL at 11:52

## 2025-07-05 RX ADMIN — HALOPERIDOL 5 MG: 5 TABLET ORAL at 08:01

## 2025-07-05 RX ADMIN — METHOCARBAMOL TABLETS 750 MG: 500 TABLET, COATED ORAL at 11:52

## 2025-07-05 RX ADMIN — HYDROXYZINE HYDROCHLORIDE 100 MG: 50 TABLET ORAL at 14:34

## 2025-07-05 RX ADMIN — ACETAMINOPHEN 975 MG: 325 TABLET, FILM COATED ORAL at 05:14

## 2025-07-05 RX ADMIN — METHOCARBAMOL TABLETS 750 MG: 500 TABLET, COATED ORAL at 17:47

## 2025-07-06 PROCEDURE — 99232 SBSQ HOSP IP/OBS MODERATE 35: CPT | Performed by: PSYCHIATRY & NEUROLOGY

## 2025-07-06 RX ADMIN — METHOCARBAMOL TABLETS 750 MG: 500 TABLET, COATED ORAL at 17:59

## 2025-07-06 RX ADMIN — ACETAMINOPHEN 975 MG: 325 TABLET, FILM COATED ORAL at 17:59

## 2025-07-06 RX ADMIN — ACETAMINOPHEN 975 MG: 325 TABLET, FILM COATED ORAL at 06:27

## 2025-07-06 RX ADMIN — QUETIAPINE FUMARATE 250 MG: 200 TABLET ORAL at 21:55

## 2025-07-06 RX ADMIN — ACETAMINOPHEN 975 MG: 325 TABLET, FILM COATED ORAL at 11:55

## 2025-07-06 RX ADMIN — METHOCARBAMOL TABLETS 750 MG: 500 TABLET, COATED ORAL at 11:55

## 2025-07-06 RX ADMIN — HYDROXYZINE HYDROCHLORIDE 100 MG: 50 TABLET ORAL at 16:45

## 2025-07-06 RX ADMIN — GABAPENTIN 300 MG: 300 CAPSULE ORAL at 21:56

## 2025-07-06 RX ADMIN — SENNOSIDES AND DOCUSATE SODIUM 2 TABLET: 50; 8.6 TABLET ORAL at 21:55

## 2025-07-06 RX ADMIN — IBUPROFEN 800 MG: 800 TABLET, FILM COATED ORAL at 22:02

## 2025-07-06 RX ADMIN — METHOCARBAMOL TABLETS 750 MG: 500 TABLET, COATED ORAL at 06:27

## 2025-07-06 RX ADMIN — GABAPENTIN 300 MG: 300 CAPSULE ORAL at 08:33

## 2025-07-06 RX ADMIN — GABAPENTIN 300 MG: 300 CAPSULE ORAL at 16:45

## 2025-07-06 RX ADMIN — PROPRANOLOL HYDROCHLORIDE 10 MG: 10 TABLET ORAL at 17:59

## 2025-07-07 PROCEDURE — 99232 SBSQ HOSP IP/OBS MODERATE 35: CPT | Performed by: STUDENT IN AN ORGANIZED HEALTH CARE EDUCATION/TRAINING PROGRAM

## 2025-07-07 RX ORDER — PROPRANOLOL HYDROCHLORIDE 10 MG/1
10 TABLET ORAL
Status: DISCONTINUED | OUTPATIENT
Start: 2025-07-07 | End: 2025-07-18

## 2025-07-07 RX ORDER — METHOCARBAMOL 500 MG/1
750 TABLET, FILM COATED ORAL EVERY 6 HOURS SCHEDULED
Status: DISCONTINUED | OUTPATIENT
Start: 2025-07-07 | End: 2025-08-04 | Stop reason: HOSPADM

## 2025-07-07 RX ORDER — QUETIAPINE FUMARATE 300 MG/1
300 TABLET, FILM COATED ORAL
Status: DISCONTINUED | OUTPATIENT
Start: 2025-07-07 | End: 2025-07-09

## 2025-07-07 RX ADMIN — GABAPENTIN 300 MG: 300 CAPSULE ORAL at 16:00

## 2025-07-07 RX ADMIN — IBUPROFEN 600 MG: 600 TABLET ORAL at 17:49

## 2025-07-07 RX ADMIN — METHOCARBAMOL 750 MG: 500 TABLET ORAL at 09:57

## 2025-07-07 RX ADMIN — METHOCARBAMOL TABLETS 750 MG: 500 TABLET, COATED ORAL at 00:45

## 2025-07-07 RX ADMIN — PROPRANOLOL HYDROCHLORIDE 10 MG: 10 TABLET ORAL at 21:21

## 2025-07-07 RX ADMIN — METHOCARBAMOL 750 MG: 500 TABLET ORAL at 17:47

## 2025-07-07 RX ADMIN — QUETIAPINE FUMARATE 300 MG: 300 TABLET ORAL at 21:21

## 2025-07-07 RX ADMIN — ACETAMINOPHEN 975 MG: 325 TABLET, FILM COATED ORAL at 00:45

## 2025-07-07 RX ADMIN — ACETAMINOPHEN 975 MG: 325 TABLET, FILM COATED ORAL at 20:32

## 2025-07-07 RX ADMIN — GABAPENTIN 300 MG: 300 CAPSULE ORAL at 08:45

## 2025-07-07 RX ADMIN — ACETAMINOPHEN 975 MG: 325 TABLET, FILM COATED ORAL at 13:52

## 2025-07-07 RX ADMIN — GABAPENTIN 300 MG: 300 CAPSULE ORAL at 21:21

## 2025-07-07 RX ADMIN — IBUPROFEN 800 MG: 800 TABLET, FILM COATED ORAL at 09:57

## 2025-07-08 PROCEDURE — 99232 SBSQ HOSP IP/OBS MODERATE 35: CPT | Performed by: STUDENT IN AN ORGANIZED HEALTH CARE EDUCATION/TRAINING PROGRAM

## 2025-07-08 RX ORDER — ACETAMINOPHEN 325 MG/1
975 TABLET ORAL EVERY 8 HOURS SCHEDULED
Status: DISCONTINUED | OUTPATIENT
Start: 2025-07-08 | End: 2025-07-10

## 2025-07-08 RX ADMIN — METHOCARBAMOL 750 MG: 500 TABLET ORAL at 06:07

## 2025-07-08 RX ADMIN — METHOCARBAMOL 750 MG: 500 TABLET ORAL at 12:30

## 2025-07-08 RX ADMIN — ACETAMINOPHEN 975 MG: 325 TABLET, FILM COATED ORAL at 21:29

## 2025-07-08 RX ADMIN — GABAPENTIN 300 MG: 300 CAPSULE ORAL at 08:27

## 2025-07-08 RX ADMIN — GABAPENTIN 300 MG: 300 CAPSULE ORAL at 16:58

## 2025-07-08 RX ADMIN — QUETIAPINE FUMARATE 300 MG: 300 TABLET ORAL at 21:29

## 2025-07-08 RX ADMIN — METHOCARBAMOL 750 MG: 500 TABLET ORAL at 23:03

## 2025-07-08 RX ADMIN — ACETAMINOPHEN 975 MG: 325 TABLET, FILM COATED ORAL at 00:27

## 2025-07-08 RX ADMIN — METHOCARBAMOL 750 MG: 500 TABLET ORAL at 17:49

## 2025-07-08 RX ADMIN — PROPRANOLOL HYDROCHLORIDE 10 MG: 10 TABLET ORAL at 21:29

## 2025-07-08 RX ADMIN — GABAPENTIN 300 MG: 300 CAPSULE ORAL at 21:29

## 2025-07-08 RX ADMIN — IBUPROFEN 800 MG: 800 TABLET, FILM COATED ORAL at 08:28

## 2025-07-08 RX ADMIN — ACETAMINOPHEN 975 MG: 325 TABLET, FILM COATED ORAL at 06:07

## 2025-07-08 RX ADMIN — METHOCARBAMOL 750 MG: 500 TABLET ORAL at 00:28

## 2025-07-09 LAB
25(OH)D3 SERPL-MCNC: 14.6 NG/ML (ref 30–100)
ALBUMIN SERPL BCG-MCNC: 4.2 G/DL (ref 3.5–5)
ALP SERPL-CCNC: 48 U/L (ref 34–104)
ALT SERPL W P-5'-P-CCNC: 28 U/L (ref 7–52)
AMPHETAMINES SERPL QL SCN: NEGATIVE
ANION GAP SERPL CALCULATED.3IONS-SCNC: 8 MMOL/L (ref 4–13)
AST SERPL W P-5'-P-CCNC: 28 U/L (ref 13–39)
BARBITURATES UR QL: NEGATIVE
BENZODIAZ UR QL: NEGATIVE
BILIRUB SERPL-MCNC: 0.32 MG/DL (ref 0.2–1)
BUN SERPL-MCNC: 16 MG/DL (ref 5–25)
CALCIUM SERPL-MCNC: 9.6 MG/DL (ref 8.4–10.2)
CHLORIDE SERPL-SCNC: 103 MMOL/L (ref 96–108)
CHOLEST SERPL-MCNC: 143 MG/DL (ref ?–200)
CO2 SERPL-SCNC: 28 MMOL/L (ref 21–32)
COCAINE UR QL: NEGATIVE
CREAT SERPL-MCNC: 0.78 MG/DL (ref 0.6–1.3)
FENTANYL UR QL SCN: NEGATIVE
FOLATE SERPL-MCNC: 8.8 NG/ML
GFR SERPL CREATININE-BSD FRML MDRD: 124 ML/MIN/1.73SQ M
GLUCOSE P FAST SERPL-MCNC: 86 MG/DL (ref 65–99)
GLUCOSE SERPL-MCNC: 86 MG/DL (ref 65–140)
HDLC SERPL-MCNC: 56 MG/DL
HYDROCODONE UR QL SCN: NEGATIVE
LDLC SERPL CALC-MCNC: 74 MG/DL (ref 0–100)
METHADONE UR QL: NEGATIVE
NONHDLC SERPL-MCNC: 87 MG/DL
OPIATES UR QL SCN: NEGATIVE
OXYCODONE+OXYMORPHONE UR QL SCN: NEGATIVE
PCP UR QL: NEGATIVE
POTASSIUM SERPL-SCNC: 3.5 MMOL/L (ref 3.5–5.3)
PROT SERPL-MCNC: 7 G/DL (ref 6.4–8.4)
SODIUM SERPL-SCNC: 139 MMOL/L (ref 135–147)
THC UR QL: POSITIVE
TREPONEMA PALLIDUM IGG+IGM AB [PRESENCE] IN SERUM OR PLASMA BY IMMUNOASSAY: NORMAL
TRIGL SERPL-MCNC: 66 MG/DL (ref ?–150)
TSH SERPL DL<=0.05 MIU/L-ACNC: 2.2 UIU/ML (ref 0.45–4.5)
VIT B12 SERPL-MCNC: 406 PG/ML (ref 180–914)

## 2025-07-09 PROCEDURE — 80061 LIPID PANEL: CPT | Performed by: PHYSICIAN ASSISTANT

## 2025-07-09 PROCEDURE — 80307 DRUG TEST PRSMV CHEM ANLYZR: CPT

## 2025-07-09 PROCEDURE — 84443 ASSAY THYROID STIM HORMONE: CPT | Performed by: PHYSICIAN ASSISTANT

## 2025-07-09 PROCEDURE — 82607 VITAMIN B-12: CPT | Performed by: PHYSICIAN ASSISTANT

## 2025-07-09 PROCEDURE — 80053 COMPREHEN METABOLIC PANEL: CPT | Performed by: PHYSICIAN ASSISTANT

## 2025-07-09 PROCEDURE — 99232 SBSQ HOSP IP/OBS MODERATE 35: CPT | Performed by: STUDENT IN AN ORGANIZED HEALTH CARE EDUCATION/TRAINING PROGRAM

## 2025-07-09 PROCEDURE — 82746 ASSAY OF FOLIC ACID SERUM: CPT | Performed by: PHYSICIAN ASSISTANT

## 2025-07-09 PROCEDURE — 82306 VITAMIN D 25 HYDROXY: CPT | Performed by: PHYSICIAN ASSISTANT

## 2025-07-09 PROCEDURE — 86780 TREPONEMA PALLIDUM: CPT | Performed by: PHYSICIAN ASSISTANT

## 2025-07-09 RX ORDER — LORAZEPAM 2 MG/ML
2 INJECTION INTRAMUSCULAR EVERY 6 HOURS PRN
Status: DISCONTINUED | OUTPATIENT
Start: 2025-07-09 | End: 2025-08-04 | Stop reason: HOSPADM

## 2025-07-09 RX ORDER — LORAZEPAM 1 MG/1
1 TABLET ORAL EVERY 6 HOURS PRN
Status: DISCONTINUED | OUTPATIENT
Start: 2025-07-09 | End: 2025-08-04 | Stop reason: HOSPADM

## 2025-07-09 RX ORDER — QUETIAPINE FUMARATE 200 MG/1
400 TABLET, FILM COATED ORAL
Status: DISCONTINUED | OUTPATIENT
Start: 2025-07-09 | End: 2025-07-10

## 2025-07-09 RX ADMIN — METHOCARBAMOL 750 MG: 500 TABLET ORAL at 06:01

## 2025-07-09 RX ADMIN — ACETAMINOPHEN 975 MG: 325 TABLET, FILM COATED ORAL at 06:00

## 2025-07-09 RX ADMIN — GABAPENTIN 300 MG: 300 CAPSULE ORAL at 21:29

## 2025-07-09 RX ADMIN — METHOCARBAMOL 750 MG: 500 TABLET ORAL at 17:05

## 2025-07-09 RX ADMIN — GABAPENTIN 300 MG: 300 CAPSULE ORAL at 17:05

## 2025-07-09 RX ADMIN — METHOCARBAMOL 750 MG: 500 TABLET ORAL at 11:41

## 2025-07-09 RX ADMIN — ACETAMINOPHEN 975 MG: 325 TABLET, FILM COATED ORAL at 21:28

## 2025-07-09 RX ADMIN — ACETAMINOPHEN 975 MG: 325 TABLET, FILM COATED ORAL at 13:22

## 2025-07-09 RX ADMIN — QUETIAPINE FUMARATE 400 MG: 200 TABLET ORAL at 21:28

## 2025-07-09 RX ADMIN — METHOCARBAMOL 750 MG: 500 TABLET ORAL at 23:00

## 2025-07-09 RX ADMIN — GABAPENTIN 300 MG: 300 CAPSULE ORAL at 08:16

## 2025-07-09 RX ADMIN — PROPRANOLOL HYDROCHLORIDE 10 MG: 10 TABLET ORAL at 21:29

## 2025-07-10 PROCEDURE — 99232 SBSQ HOSP IP/OBS MODERATE 35: CPT | Performed by: STUDENT IN AN ORGANIZED HEALTH CARE EDUCATION/TRAINING PROGRAM

## 2025-07-10 RX ORDER — ACETAMINOPHEN 325 MG/1
975 TABLET ORAL EVERY 8 HOURS PRN
Status: DISCONTINUED | OUTPATIENT
Start: 2025-07-10 | End: 2025-08-04 | Stop reason: HOSPADM

## 2025-07-10 RX ORDER — LITHIUM CARBONATE 450 MG
450 TABLET, EXTENDED RELEASE ORAL
Status: DISCONTINUED | OUTPATIENT
Start: 2025-07-10 | End: 2025-07-16

## 2025-07-10 RX ORDER — ACETAMINOPHEN 325 MG/1
650 TABLET ORAL EVERY 4 HOURS PRN
Status: DISCONTINUED | OUTPATIENT
Start: 2025-07-10 | End: 2025-08-04 | Stop reason: HOSPADM

## 2025-07-10 RX ORDER — ACETAMINOPHEN 325 MG/1
650 TABLET ORAL EVERY 6 HOURS PRN
Status: DISCONTINUED | OUTPATIENT
Start: 2025-07-10 | End: 2025-08-04 | Stop reason: HOSPADM

## 2025-07-10 RX ORDER — QUETIAPINE FUMARATE 300 MG/1
300 TABLET, FILM COATED ORAL
Status: DISCONTINUED | OUTPATIENT
Start: 2025-07-10 | End: 2025-08-04 | Stop reason: HOSPADM

## 2025-07-10 RX ADMIN — PROPRANOLOL HYDROCHLORIDE 10 MG: 10 TABLET ORAL at 21:20

## 2025-07-10 RX ADMIN — QUETIAPINE FUMARATE 300 MG: 300 TABLET ORAL at 21:20

## 2025-07-10 RX ADMIN — LITHIUM CARBONATE 450 MG: 450 TABLET, EXTENDED RELEASE ORAL at 21:20

## 2025-07-10 RX ADMIN — GABAPENTIN 300 MG: 300 CAPSULE ORAL at 08:50

## 2025-07-10 RX ADMIN — METHOCARBAMOL 750 MG: 500 TABLET ORAL at 23:50

## 2025-07-10 RX ADMIN — GABAPENTIN 300 MG: 300 CAPSULE ORAL at 21:20

## 2025-07-10 RX ADMIN — ACETAMINOPHEN 975 MG: 325 TABLET, FILM COATED ORAL at 06:37

## 2025-07-10 RX ADMIN — PROPRANOLOL HYDROCHLORIDE 10 MG: 10 TABLET ORAL at 13:52

## 2025-07-10 RX ADMIN — GABAPENTIN 300 MG: 300 CAPSULE ORAL at 17:10

## 2025-07-10 RX ADMIN — METHOCARBAMOL 750 MG: 500 TABLET ORAL at 17:09

## 2025-07-10 RX ADMIN — METHOCARBAMOL 750 MG: 500 TABLET ORAL at 06:38

## 2025-07-10 RX ADMIN — METHOCARBAMOL 750 MG: 500 TABLET ORAL at 11:56

## 2025-07-11 PROCEDURE — 99232 SBSQ HOSP IP/OBS MODERATE 35: CPT | Performed by: STUDENT IN AN ORGANIZED HEALTH CARE EDUCATION/TRAINING PROGRAM

## 2025-07-11 RX ADMIN — METHOCARBAMOL 750 MG: 500 TABLET ORAL at 17:01

## 2025-07-11 RX ADMIN — PROPRANOLOL HYDROCHLORIDE 10 MG: 10 TABLET ORAL at 21:28

## 2025-07-11 RX ADMIN — METHOCARBAMOL 750 MG: 500 TABLET ORAL at 11:20

## 2025-07-11 RX ADMIN — GABAPENTIN 300 MG: 300 CAPSULE ORAL at 08:51

## 2025-07-11 RX ADMIN — QUETIAPINE FUMARATE 300 MG: 300 TABLET ORAL at 21:28

## 2025-07-11 RX ADMIN — GABAPENTIN 300 MG: 300 CAPSULE ORAL at 16:47

## 2025-07-11 RX ADMIN — METHOCARBAMOL 750 MG: 500 TABLET ORAL at 05:52

## 2025-07-11 RX ADMIN — LITHIUM CARBONATE 450 MG: 450 TABLET, EXTENDED RELEASE ORAL at 21:28

## 2025-07-11 RX ADMIN — GABAPENTIN 300 MG: 300 CAPSULE ORAL at 21:28

## 2025-07-11 RX ADMIN — ACETAMINOPHEN 650 MG: 325 TABLET ORAL at 12:19

## 2025-07-12 PROCEDURE — 99232 SBSQ HOSP IP/OBS MODERATE 35: CPT | Performed by: PSYCHIATRY & NEUROLOGY

## 2025-07-12 RX ADMIN — PROPRANOLOL HYDROCHLORIDE 10 MG: 10 TABLET ORAL at 21:28

## 2025-07-12 RX ADMIN — QUETIAPINE FUMARATE 300 MG: 300 TABLET ORAL at 21:28

## 2025-07-12 RX ADMIN — METHOCARBAMOL 750 MG: 500 TABLET ORAL at 23:06

## 2025-07-12 RX ADMIN — LITHIUM CARBONATE 450 MG: 450 TABLET, EXTENDED RELEASE ORAL at 21:28

## 2025-07-12 RX ADMIN — GABAPENTIN 300 MG: 300 CAPSULE ORAL at 08:50

## 2025-07-12 RX ADMIN — METHOCARBAMOL 750 MG: 500 TABLET ORAL at 17:38

## 2025-07-12 RX ADMIN — GABAPENTIN 300 MG: 300 CAPSULE ORAL at 21:28

## 2025-07-12 RX ADMIN — METHOCARBAMOL 750 MG: 500 TABLET ORAL at 12:03

## 2025-07-12 RX ADMIN — METHOCARBAMOL 750 MG: 500 TABLET ORAL at 01:12

## 2025-07-12 RX ADMIN — GABAPENTIN 300 MG: 300 CAPSULE ORAL at 15:47

## 2025-07-12 RX ADMIN — ACETAMINOPHEN 975 MG: 325 TABLET ORAL at 23:04

## 2025-07-12 RX ADMIN — ACETAMINOPHEN 650 MG: 325 TABLET ORAL at 14:25

## 2025-07-12 RX ADMIN — METHOCARBAMOL 750 MG: 500 TABLET ORAL at 06:04

## 2025-07-13 PROCEDURE — 99232 SBSQ HOSP IP/OBS MODERATE 35: CPT | Performed by: PSYCHIATRY & NEUROLOGY

## 2025-07-13 RX ORDER — BENZTROPINE MESYLATE 0.5 MG/1
0.5 TABLET ORAL 2 TIMES DAILY
Status: DISCONTINUED | OUTPATIENT
Start: 2025-07-13 | End: 2025-07-15

## 2025-07-13 RX ADMIN — PROPRANOLOL HYDROCHLORIDE 10 MG: 10 TABLET ORAL at 21:24

## 2025-07-13 RX ADMIN — HYDROXYZINE HYDROCHLORIDE 25 MG: 25 TABLET, FILM COATED ORAL at 09:43

## 2025-07-13 RX ADMIN — ACETAMINOPHEN 650 MG: 325 TABLET ORAL at 19:35

## 2025-07-13 RX ADMIN — METHOCARBAMOL 750 MG: 500 TABLET ORAL at 11:29

## 2025-07-13 RX ADMIN — METHOCARBAMOL 750 MG: 500 TABLET ORAL at 17:19

## 2025-07-13 RX ADMIN — HYDROXYZINE HYDROCHLORIDE 25 MG: 25 TABLET, FILM COATED ORAL at 17:34

## 2025-07-13 RX ADMIN — GABAPENTIN 300 MG: 300 CAPSULE ORAL at 21:24

## 2025-07-13 RX ADMIN — ACETAMINOPHEN 650 MG: 325 TABLET ORAL at 15:58

## 2025-07-13 RX ADMIN — GABAPENTIN 300 MG: 300 CAPSULE ORAL at 08:44

## 2025-07-13 RX ADMIN — METHOCARBAMOL 750 MG: 500 TABLET ORAL at 23:35

## 2025-07-13 RX ADMIN — GABAPENTIN 300 MG: 300 CAPSULE ORAL at 15:56

## 2025-07-13 RX ADMIN — LITHIUM CARBONATE 450 MG: 450 TABLET, EXTENDED RELEASE ORAL at 21:42

## 2025-07-13 RX ADMIN — BENZTROPINE MESYLATE 0.5 MG: 0.5 TABLET ORAL at 11:29

## 2025-07-13 RX ADMIN — ACETAMINOPHEN 650 MG: 325 TABLET ORAL at 09:43

## 2025-07-13 RX ADMIN — BENZTROPINE MESYLATE 1 MG: 1 TABLET ORAL at 21:43

## 2025-07-13 RX ADMIN — QUETIAPINE FUMARATE 300 MG: 300 TABLET ORAL at 21:24

## 2025-07-13 RX ADMIN — BENZTROPINE MESYLATE 0.5 MG: 0.5 TABLET ORAL at 17:19

## 2025-07-13 RX ADMIN — METHOCARBAMOL 750 MG: 500 TABLET ORAL at 06:12

## 2025-07-13 RX ADMIN — SENNOSIDES AND DOCUSATE SODIUM 2 TABLET: 50; 8.6 TABLET ORAL at 21:24

## 2025-07-14 PROCEDURE — 99232 SBSQ HOSP IP/OBS MODERATE 35: CPT | Performed by: STUDENT IN AN ORGANIZED HEALTH CARE EDUCATION/TRAINING PROGRAM

## 2025-07-14 RX ADMIN — GABAPENTIN 300 MG: 300 CAPSULE ORAL at 08:45

## 2025-07-14 RX ADMIN — ACETAMINOPHEN 650 MG: 325 TABLET ORAL at 13:20

## 2025-07-14 RX ADMIN — LITHIUM CARBONATE 450 MG: 450 TABLET, EXTENDED RELEASE ORAL at 21:08

## 2025-07-14 RX ADMIN — METHOCARBAMOL 750 MG: 500 TABLET ORAL at 11:55

## 2025-07-14 RX ADMIN — BENZTROPINE MESYLATE 0.5 MG: 0.5 TABLET ORAL at 08:45

## 2025-07-14 RX ADMIN — ACETAMINOPHEN 650 MG: 325 TABLET ORAL at 09:29

## 2025-07-14 RX ADMIN — GABAPENTIN 300 MG: 300 CAPSULE ORAL at 21:08

## 2025-07-14 RX ADMIN — METHOCARBAMOL 750 MG: 500 TABLET ORAL at 17:27

## 2025-07-14 RX ADMIN — BENZTROPINE MESYLATE 0.5 MG: 0.5 TABLET ORAL at 17:27

## 2025-07-14 RX ADMIN — SENNOSIDES AND DOCUSATE SODIUM 2 TABLET: 50; 8.6 TABLET ORAL at 21:07

## 2025-07-14 RX ADMIN — QUETIAPINE FUMARATE 300 MG: 300 TABLET ORAL at 21:08

## 2025-07-14 RX ADMIN — HYDROXYZINE HYDROCHLORIDE 25 MG: 25 TABLET, FILM COATED ORAL at 20:15

## 2025-07-14 RX ADMIN — METHOCARBAMOL 750 MG: 500 TABLET ORAL at 06:08

## 2025-07-14 RX ADMIN — PROPRANOLOL HYDROCHLORIDE 10 MG: 10 TABLET ORAL at 21:07

## 2025-07-14 RX ADMIN — HYDROXYZINE HYDROCHLORIDE 25 MG: 25 TABLET, FILM COATED ORAL at 09:29

## 2025-07-14 RX ADMIN — GABAPENTIN 300 MG: 300 CAPSULE ORAL at 15:57

## 2025-07-15 LAB
ANION GAP SERPL CALCULATED.3IONS-SCNC: 7 MMOL/L (ref 4–13)
BUN SERPL-MCNC: 18 MG/DL (ref 5–25)
CALCIUM SERPL-MCNC: 10.2 MG/DL (ref 8.4–10.2)
CHLORIDE SERPL-SCNC: 98 MMOL/L (ref 96–108)
CO2 SERPL-SCNC: 31 MMOL/L (ref 21–32)
CREAT SERPL-MCNC: 0.88 MG/DL (ref 0.6–1.3)
GFR SERPL CREATININE-BSD FRML MDRD: 118 ML/MIN/1.73SQ M
GLUCOSE SERPL-MCNC: 92 MG/DL (ref 65–140)
LITHIUM SERPL-SCNC: 0.11 MMOL/L (ref 0.6–1.2)
POTASSIUM SERPL-SCNC: 4.3 MMOL/L (ref 3.5–5.3)
SODIUM SERPL-SCNC: 136 MMOL/L (ref 135–147)
TSH SERPL DL<=0.05 MIU/L-ACNC: 2.33 UIU/ML (ref 0.45–4.5)

## 2025-07-15 PROCEDURE — 99232 SBSQ HOSP IP/OBS MODERATE 35: CPT | Performed by: STUDENT IN AN ORGANIZED HEALTH CARE EDUCATION/TRAINING PROGRAM

## 2025-07-15 PROCEDURE — 80048 BASIC METABOLIC PNL TOTAL CA: CPT | Performed by: PHYSICIAN ASSISTANT

## 2025-07-15 PROCEDURE — 84443 ASSAY THYROID STIM HORMONE: CPT | Performed by: PHYSICIAN ASSISTANT

## 2025-07-15 PROCEDURE — 80178 ASSAY OF LITHIUM: CPT | Performed by: PHYSICIAN ASSISTANT

## 2025-07-15 RX ORDER — BENZTROPINE MESYLATE 0.5 MG/1
0.5 TABLET ORAL 3 TIMES DAILY
Status: DISCONTINUED | OUTPATIENT
Start: 2025-07-15 | End: 2025-07-18

## 2025-07-15 RX ADMIN — METHOCARBAMOL 750 MG: 500 TABLET ORAL at 23:02

## 2025-07-15 RX ADMIN — CEPHALEXIN 500 MG: 250 CAPSULE ORAL at 21:20

## 2025-07-15 RX ADMIN — BENZTROPINE MESYLATE 0.5 MG: 0.5 TABLET ORAL at 08:47

## 2025-07-15 RX ADMIN — METHOCARBAMOL 750 MG: 500 TABLET ORAL at 11:58

## 2025-07-15 RX ADMIN — SENNOSIDES AND DOCUSATE SODIUM 2 TABLET: 50; 8.6 TABLET ORAL at 21:11

## 2025-07-15 RX ADMIN — BENZTROPINE MESYLATE 0.5 MG: 0.5 TABLET ORAL at 21:11

## 2025-07-15 RX ADMIN — BENZTROPINE MESYLATE 1 MG: 1 TABLET ORAL at 15:40

## 2025-07-15 RX ADMIN — BENZTROPINE MESYLATE 0.5 MG: 0.5 TABLET ORAL at 15:40

## 2025-07-15 RX ADMIN — PROPRANOLOL HYDROCHLORIDE 10 MG: 10 TABLET ORAL at 21:11

## 2025-07-15 RX ADMIN — METHOCARBAMOL 750 MG: 500 TABLET ORAL at 00:15

## 2025-07-15 RX ADMIN — GABAPENTIN 300 MG: 300 CAPSULE ORAL at 15:41

## 2025-07-15 RX ADMIN — GABAPENTIN 300 MG: 300 CAPSULE ORAL at 21:21

## 2025-07-15 RX ADMIN — GABAPENTIN 300 MG: 300 CAPSULE ORAL at 08:47

## 2025-07-15 RX ADMIN — METHOCARBAMOL 750 MG: 500 TABLET ORAL at 06:25

## 2025-07-15 RX ADMIN — CEPHALEXIN 500 MG: 250 CAPSULE ORAL at 14:38

## 2025-07-15 RX ADMIN — QUETIAPINE FUMARATE 300 MG: 300 TABLET ORAL at 21:11

## 2025-07-15 RX ADMIN — LITHIUM CARBONATE 450 MG: 450 TABLET, EXTENDED RELEASE ORAL at 21:11

## 2025-07-15 RX ADMIN — METHOCARBAMOL 750 MG: 500 TABLET ORAL at 17:22

## 2025-07-16 PROCEDURE — 99232 SBSQ HOSP IP/OBS MODERATE 35: CPT | Performed by: STUDENT IN AN ORGANIZED HEALTH CARE EDUCATION/TRAINING PROGRAM

## 2025-07-16 RX ORDER — LITHIUM CARBONATE 450 MG
900 TABLET, EXTENDED RELEASE ORAL
Status: DISCONTINUED | OUTPATIENT
Start: 2025-07-16 | End: 2025-08-04 | Stop reason: HOSPADM

## 2025-07-16 RX ADMIN — GABAPENTIN 300 MG: 300 CAPSULE ORAL at 16:35

## 2025-07-16 RX ADMIN — BENZTROPINE MESYLATE 0.5 MG: 0.5 TABLET ORAL at 16:35

## 2025-07-16 RX ADMIN — GABAPENTIN 300 MG: 300 CAPSULE ORAL at 21:29

## 2025-07-16 RX ADMIN — METHOCARBAMOL 750 MG: 500 TABLET ORAL at 23:04

## 2025-07-16 RX ADMIN — BENZTROPINE MESYLATE 0.5 MG: 0.5 TABLET ORAL at 21:30

## 2025-07-16 RX ADMIN — GABAPENTIN 300 MG: 300 CAPSULE ORAL at 08:50

## 2025-07-16 RX ADMIN — LITHIUM CARBONATE 900 MG: 450 TABLET, EXTENDED RELEASE ORAL at 21:29

## 2025-07-16 RX ADMIN — CEPHALEXIN 500 MG: 250 CAPSULE ORAL at 11:59

## 2025-07-16 RX ADMIN — SENNOSIDES AND DOCUSATE SODIUM 2 TABLET: 50; 8.6 TABLET ORAL at 21:30

## 2025-07-16 RX ADMIN — PROPRANOLOL HYDROCHLORIDE 10 MG: 10 TABLET ORAL at 21:30

## 2025-07-16 RX ADMIN — METHOCARBAMOL 750 MG: 500 TABLET ORAL at 17:56

## 2025-07-16 RX ADMIN — CEPHALEXIN 500 MG: 250 CAPSULE ORAL at 23:04

## 2025-07-16 RX ADMIN — METHOCARBAMOL 750 MG: 500 TABLET ORAL at 11:59

## 2025-07-16 RX ADMIN — CEPHALEXIN 500 MG: 250 CAPSULE ORAL at 06:22

## 2025-07-16 RX ADMIN — BENZTROPINE MESYLATE 0.5 MG: 0.5 TABLET ORAL at 08:50

## 2025-07-16 RX ADMIN — QUETIAPINE FUMARATE 300 MG: 300 TABLET ORAL at 21:29

## 2025-07-16 RX ADMIN — CEPHALEXIN 500 MG: 250 CAPSULE ORAL at 17:56

## 2025-07-16 RX ADMIN — HYDROXYZINE HYDROCHLORIDE 25 MG: 25 TABLET, FILM COATED ORAL at 10:36

## 2025-07-16 RX ADMIN — METHOCARBAMOL 750 MG: 500 TABLET ORAL at 06:22

## 2025-07-17 PROCEDURE — 99232 SBSQ HOSP IP/OBS MODERATE 35: CPT | Performed by: STUDENT IN AN ORGANIZED HEALTH CARE EDUCATION/TRAINING PROGRAM

## 2025-07-17 RX ADMIN — METHOCARBAMOL 750 MG: 500 TABLET ORAL at 06:39

## 2025-07-17 RX ADMIN — PROPRANOLOL HYDROCHLORIDE 10 MG: 10 TABLET ORAL at 21:31

## 2025-07-17 RX ADMIN — CEPHALEXIN 500 MG: 250 CAPSULE ORAL at 12:03

## 2025-07-17 RX ADMIN — METHOCARBAMOL 750 MG: 500 TABLET ORAL at 23:03

## 2025-07-17 RX ADMIN — LITHIUM CARBONATE 900 MG: 450 TABLET, EXTENDED RELEASE ORAL at 21:26

## 2025-07-17 RX ADMIN — GABAPENTIN 300 MG: 300 CAPSULE ORAL at 08:56

## 2025-07-17 RX ADMIN — CEPHALEXIN 500 MG: 250 CAPSULE ORAL at 23:03

## 2025-07-17 RX ADMIN — METHOCARBAMOL 750 MG: 500 TABLET ORAL at 12:03

## 2025-07-17 RX ADMIN — BENZTROPINE MESYLATE 0.5 MG: 0.5 TABLET ORAL at 21:26

## 2025-07-17 RX ADMIN — BENZTROPINE MESYLATE 0.5 MG: 0.5 TABLET ORAL at 08:56

## 2025-07-17 RX ADMIN — GABAPENTIN 300 MG: 300 CAPSULE ORAL at 16:35

## 2025-07-17 RX ADMIN — METHOCARBAMOL 750 MG: 500 TABLET ORAL at 17:37

## 2025-07-17 RX ADMIN — QUETIAPINE FUMARATE 300 MG: 300 TABLET ORAL at 21:26

## 2025-07-17 RX ADMIN — BENZTROPINE MESYLATE 0.5 MG: 0.5 TABLET ORAL at 16:36

## 2025-07-17 RX ADMIN — CEPHALEXIN 500 MG: 250 CAPSULE ORAL at 06:39

## 2025-07-17 RX ADMIN — GABAPENTIN 300 MG: 300 CAPSULE ORAL at 21:26

## 2025-07-17 RX ADMIN — SENNOSIDES AND DOCUSATE SODIUM 2 TABLET: 50; 8.6 TABLET ORAL at 21:26

## 2025-07-17 RX ADMIN — CEPHALEXIN 500 MG: 250 CAPSULE ORAL at 17:38

## 2025-07-18 PROCEDURE — 99232 SBSQ HOSP IP/OBS MODERATE 35: CPT | Performed by: STUDENT IN AN ORGANIZED HEALTH CARE EDUCATION/TRAINING PROGRAM

## 2025-07-18 RX ORDER — HYDROXYZINE HYDROCHLORIDE 25 MG/1
25 TABLET, FILM COATED ORAL
Status: DISCONTINUED | OUTPATIENT
Start: 2025-07-18 | End: 2025-08-04 | Stop reason: HOSPADM

## 2025-07-18 RX ORDER — BENZTROPINE MESYLATE 1 MG/1
1 TABLET ORAL EVERY EVENING
Status: DISCONTINUED | OUTPATIENT
Start: 2025-07-18 | End: 2025-07-21

## 2025-07-18 RX ORDER — PROPRANOLOL HYDROCHLORIDE 10 MG/1
10 TABLET ORAL EVERY EVENING
Status: DISCONTINUED | OUTPATIENT
Start: 2025-07-18 | End: 2025-07-21

## 2025-07-18 RX ADMIN — ACETAMINOPHEN 650 MG: 325 TABLET ORAL at 14:29

## 2025-07-18 RX ADMIN — LITHIUM CARBONATE 900 MG: 450 TABLET, EXTENDED RELEASE ORAL at 21:23

## 2025-07-18 RX ADMIN — GABAPENTIN 300 MG: 300 CAPSULE ORAL at 16:08

## 2025-07-18 RX ADMIN — CEPHALEXIN 500 MG: 250 CAPSULE ORAL at 17:55

## 2025-07-18 RX ADMIN — QUETIAPINE FUMARATE 300 MG: 300 TABLET ORAL at 21:23

## 2025-07-18 RX ADMIN — METHOCARBAMOL 750 MG: 500 TABLET ORAL at 05:57

## 2025-07-18 RX ADMIN — BENZTROPINE MESYLATE 0.5 MG: 0.5 TABLET ORAL at 08:57

## 2025-07-18 RX ADMIN — CEPHALEXIN 500 MG: 250 CAPSULE ORAL at 05:57

## 2025-07-18 RX ADMIN — PROPRANOLOL HYDROCHLORIDE 10 MG: 10 TABLET ORAL at 17:54

## 2025-07-18 RX ADMIN — CEPHALEXIN 500 MG: 250 CAPSULE ORAL at 12:21

## 2025-07-18 RX ADMIN — HYDROXYZINE HYDROCHLORIDE 25 MG: 25 TABLET, FILM COATED ORAL at 14:30

## 2025-07-18 RX ADMIN — SENNOSIDES AND DOCUSATE SODIUM 2 TABLET: 50; 8.6 TABLET ORAL at 21:23

## 2025-07-18 RX ADMIN — BENZTROPINE MESYLATE 1 MG: 1 TABLET ORAL at 17:54

## 2025-07-18 RX ADMIN — METHOCARBAMOL 750 MG: 500 TABLET ORAL at 17:53

## 2025-07-18 RX ADMIN — GABAPENTIN 300 MG: 300 CAPSULE ORAL at 21:23

## 2025-07-18 RX ADMIN — HYDROXYZINE HYDROCHLORIDE 25 MG: 25 TABLET, FILM COATED ORAL at 14:31

## 2025-07-18 RX ADMIN — METHOCARBAMOL 750 MG: 500 TABLET ORAL at 12:20

## 2025-07-18 RX ADMIN — GABAPENTIN 300 MG: 300 CAPSULE ORAL at 08:57

## 2025-07-19 PROCEDURE — 99232 SBSQ HOSP IP/OBS MODERATE 35: CPT | Performed by: PSYCHIATRY & NEUROLOGY

## 2025-07-19 RX ADMIN — GABAPENTIN 300 MG: 300 CAPSULE ORAL at 15:19

## 2025-07-19 RX ADMIN — HYDROXYZINE HYDROCHLORIDE 25 MG: 25 TABLET, FILM COATED ORAL at 21:23

## 2025-07-19 RX ADMIN — GABAPENTIN 300 MG: 300 CAPSULE ORAL at 21:22

## 2025-07-19 RX ADMIN — GABAPENTIN 300 MG: 300 CAPSULE ORAL at 08:57

## 2025-07-19 RX ADMIN — PROPRANOLOL HYDROCHLORIDE 10 MG: 10 TABLET ORAL at 17:23

## 2025-07-19 RX ADMIN — HYDROXYZINE HYDROCHLORIDE 25 MG: 25 TABLET, FILM COATED ORAL at 15:19

## 2025-07-19 RX ADMIN — CEPHALEXIN 500 MG: 250 CAPSULE ORAL at 11:56

## 2025-07-19 RX ADMIN — METHOCARBAMOL 750 MG: 500 TABLET ORAL at 11:56

## 2025-07-19 RX ADMIN — ACETAMINOPHEN 650 MG: 325 TABLET ORAL at 19:34

## 2025-07-19 RX ADMIN — METHOCARBAMOL 750 MG: 500 TABLET ORAL at 06:21

## 2025-07-19 RX ADMIN — QUETIAPINE FUMARATE 300 MG: 300 TABLET ORAL at 21:22

## 2025-07-19 RX ADMIN — LITHIUM CARBONATE 900 MG: 450 TABLET, EXTENDED RELEASE ORAL at 21:22

## 2025-07-19 RX ADMIN — METHOCARBAMOL 750 MG: 500 TABLET ORAL at 17:23

## 2025-07-19 RX ADMIN — CEPHALEXIN 500 MG: 250 CAPSULE ORAL at 01:11

## 2025-07-19 RX ADMIN — ACETAMINOPHEN 650 MG: 325 TABLET ORAL at 13:42

## 2025-07-19 RX ADMIN — BENZTROPINE MESYLATE 1 MG: 1 TABLET ORAL at 17:23

## 2025-07-19 RX ADMIN — METHOCARBAMOL 750 MG: 500 TABLET ORAL at 01:10

## 2025-07-19 RX ADMIN — CEPHALEXIN 500 MG: 250 CAPSULE ORAL at 17:23

## 2025-07-19 RX ADMIN — CEPHALEXIN 500 MG: 250 CAPSULE ORAL at 06:22

## 2025-07-20 PROCEDURE — 99232 SBSQ HOSP IP/OBS MODERATE 35: CPT | Performed by: PSYCHIATRY & NEUROLOGY

## 2025-07-20 RX ADMIN — BENZTROPINE MESYLATE 1 MG: 1 TABLET ORAL at 17:31

## 2025-07-20 RX ADMIN — QUETIAPINE FUMARATE 300 MG: 300 TABLET ORAL at 21:24

## 2025-07-20 RX ADMIN — CEPHALEXIN 500 MG: 250 CAPSULE ORAL at 12:02

## 2025-07-20 RX ADMIN — PROPRANOLOL HYDROCHLORIDE 10 MG: 10 TABLET ORAL at 17:32

## 2025-07-20 RX ADMIN — CEPHALEXIN 500 MG: 250 CAPSULE ORAL at 05:21

## 2025-07-20 RX ADMIN — GABAPENTIN 300 MG: 300 CAPSULE ORAL at 21:24

## 2025-07-20 RX ADMIN — METHOCARBAMOL 750 MG: 500 TABLET ORAL at 12:02

## 2025-07-20 RX ADMIN — SENNOSIDES AND DOCUSATE SODIUM 2 TABLET: 50; 8.6 TABLET ORAL at 21:24

## 2025-07-20 RX ADMIN — METHOCARBAMOL 750 MG: 500 TABLET ORAL at 05:21

## 2025-07-20 RX ADMIN — CEPHALEXIN 500 MG: 250 CAPSULE ORAL at 17:31

## 2025-07-20 RX ADMIN — METHOCARBAMOL 750 MG: 500 TABLET ORAL at 17:31

## 2025-07-20 RX ADMIN — LITHIUM CARBONATE 900 MG: 450 TABLET, EXTENDED RELEASE ORAL at 21:24

## 2025-07-20 RX ADMIN — GABAPENTIN 300 MG: 300 CAPSULE ORAL at 16:54

## 2025-07-20 RX ADMIN — GABAPENTIN 300 MG: 300 CAPSULE ORAL at 08:47

## 2025-07-21 PROCEDURE — 99232 SBSQ HOSP IP/OBS MODERATE 35: CPT | Performed by: STUDENT IN AN ORGANIZED HEALTH CARE EDUCATION/TRAINING PROGRAM

## 2025-07-21 RX ORDER — PROPRANOLOL HYDROCHLORIDE 10 MG/1
10 TABLET ORAL
Status: DISCONTINUED | OUTPATIENT
Start: 2025-07-21 | End: 2025-08-04 | Stop reason: HOSPADM

## 2025-07-21 RX ORDER — BENZTROPINE MESYLATE 1 MG/1
1 TABLET ORAL
Status: DISCONTINUED | OUTPATIENT
Start: 2025-07-21 | End: 2025-07-31

## 2025-07-21 RX ADMIN — LITHIUM CARBONATE 900 MG: 450 TABLET, EXTENDED RELEASE ORAL at 21:42

## 2025-07-21 RX ADMIN — CEPHALEXIN 500 MG: 250 CAPSULE ORAL at 23:03

## 2025-07-21 RX ADMIN — PROPRANOLOL HYDROCHLORIDE 10 MG: 10 TABLET ORAL at 21:43

## 2025-07-21 RX ADMIN — METHOCARBAMOL 750 MG: 500 TABLET ORAL at 06:05

## 2025-07-21 RX ADMIN — GABAPENTIN 300 MG: 300 CAPSULE ORAL at 16:44

## 2025-07-21 RX ADMIN — BENZTROPINE MESYLATE 1 MG: 1 TABLET ORAL at 21:43

## 2025-07-21 RX ADMIN — CEPHALEXIN 500 MG: 250 CAPSULE ORAL at 06:05

## 2025-07-21 RX ADMIN — METHOCARBAMOL 750 MG: 500 TABLET ORAL at 17:58

## 2025-07-21 RX ADMIN — GABAPENTIN 300 MG: 300 CAPSULE ORAL at 08:44

## 2025-07-21 RX ADMIN — SENNOSIDES AND DOCUSATE SODIUM 2 TABLET: 50; 8.6 TABLET ORAL at 21:43

## 2025-07-21 RX ADMIN — METHOCARBAMOL 750 MG: 500 TABLET ORAL at 23:03

## 2025-07-21 RX ADMIN — QUETIAPINE FUMARATE 300 MG: 300 TABLET ORAL at 21:43

## 2025-07-21 RX ADMIN — METHOCARBAMOL 750 MG: 500 TABLET ORAL at 12:00

## 2025-07-21 RX ADMIN — GABAPENTIN 300 MG: 300 CAPSULE ORAL at 21:43

## 2025-07-21 RX ADMIN — CEPHALEXIN 500 MG: 250 CAPSULE ORAL at 12:00

## 2025-07-21 RX ADMIN — METHOCARBAMOL 750 MG: 500 TABLET ORAL at 00:01

## 2025-07-21 RX ADMIN — CEPHALEXIN 500 MG: 250 CAPSULE ORAL at 00:01

## 2025-07-21 RX ADMIN — CEPHALEXIN 500 MG: 250 CAPSULE ORAL at 17:58

## 2025-07-22 LAB
ANION GAP SERPL CALCULATED.3IONS-SCNC: 6 MMOL/L (ref 4–13)
BUN SERPL-MCNC: 17 MG/DL (ref 5–25)
CALCIUM SERPL-MCNC: 9.7 MG/DL (ref 8.4–10.2)
CHLORIDE SERPL-SCNC: 103 MMOL/L (ref 96–108)
CO2 SERPL-SCNC: 29 MMOL/L (ref 21–32)
CREAT SERPL-MCNC: 0.8 MG/DL (ref 0.6–1.3)
GFR SERPL CREATININE-BSD FRML MDRD: 123 ML/MIN/1.73SQ M
GLUCOSE P FAST SERPL-MCNC: 92 MG/DL (ref 65–99)
GLUCOSE SERPL-MCNC: 92 MG/DL (ref 65–140)
LITHIUM SERPL-SCNC: 0.64 MMOL/L (ref 0.6–1.2)
POTASSIUM SERPL-SCNC: 3.6 MMOL/L (ref 3.5–5.3)
SODIUM SERPL-SCNC: 138 MMOL/L (ref 135–147)
TSH SERPL DL<=0.05 MIU/L-ACNC: 2.94 UIU/ML (ref 0.45–4.5)

## 2025-07-22 PROCEDURE — 80048 BASIC METABOLIC PNL TOTAL CA: CPT | Performed by: PHYSICIAN ASSISTANT

## 2025-07-22 PROCEDURE — 99232 SBSQ HOSP IP/OBS MODERATE 35: CPT | Performed by: STUDENT IN AN ORGANIZED HEALTH CARE EDUCATION/TRAINING PROGRAM

## 2025-07-22 PROCEDURE — 80178 ASSAY OF LITHIUM: CPT | Performed by: PHYSICIAN ASSISTANT

## 2025-07-22 PROCEDURE — 84443 ASSAY THYROID STIM HORMONE: CPT | Performed by: PHYSICIAN ASSISTANT

## 2025-07-22 RX ADMIN — SENNOSIDES AND DOCUSATE SODIUM 2 TABLET: 50; 8.6 TABLET ORAL at 21:32

## 2025-07-22 RX ADMIN — METHOCARBAMOL 750 MG: 500 TABLET ORAL at 06:05

## 2025-07-22 RX ADMIN — GABAPENTIN 300 MG: 300 CAPSULE ORAL at 08:50

## 2025-07-22 RX ADMIN — BENZTROPINE MESYLATE 1 MG: 1 TABLET ORAL at 21:31

## 2025-07-22 RX ADMIN — QUETIAPINE FUMARATE 300 MG: 300 TABLET ORAL at 21:32

## 2025-07-22 RX ADMIN — LITHIUM CARBONATE 900 MG: 450 TABLET, EXTENDED RELEASE ORAL at 21:32

## 2025-07-22 RX ADMIN — METHOCARBAMOL 750 MG: 500 TABLET ORAL at 17:10

## 2025-07-22 RX ADMIN — CEPHALEXIN 500 MG: 250 CAPSULE ORAL at 06:05

## 2025-07-22 RX ADMIN — GABAPENTIN 300 MG: 300 CAPSULE ORAL at 17:10

## 2025-07-22 RX ADMIN — METHOCARBAMOL 750 MG: 500 TABLET ORAL at 12:15

## 2025-07-22 RX ADMIN — GABAPENTIN 300 MG: 300 CAPSULE ORAL at 21:32

## 2025-07-22 RX ADMIN — PROPRANOLOL HYDROCHLORIDE 10 MG: 10 TABLET ORAL at 21:32

## 2025-07-22 RX ADMIN — METHOCARBAMOL 750 MG: 500 TABLET ORAL at 23:04

## 2025-07-23 PROCEDURE — 99232 SBSQ HOSP IP/OBS MODERATE 35: CPT | Performed by: STUDENT IN AN ORGANIZED HEALTH CARE EDUCATION/TRAINING PROGRAM

## 2025-07-23 RX ADMIN — SENNOSIDES AND DOCUSATE SODIUM 2 TABLET: 50; 8.6 TABLET ORAL at 21:26

## 2025-07-23 RX ADMIN — BENZTROPINE MESYLATE 1 MG: 1 TABLET ORAL at 21:27

## 2025-07-23 RX ADMIN — METHOCARBAMOL 750 MG: 500 TABLET ORAL at 23:02

## 2025-07-23 RX ADMIN — GABAPENTIN 300 MG: 300 CAPSULE ORAL at 16:48

## 2025-07-23 RX ADMIN — METHOCARBAMOL 750 MG: 500 TABLET ORAL at 17:02

## 2025-07-23 RX ADMIN — METHOCARBAMOL 750 MG: 500 TABLET ORAL at 06:09

## 2025-07-23 RX ADMIN — QUETIAPINE FUMARATE 300 MG: 300 TABLET ORAL at 21:26

## 2025-07-23 RX ADMIN — PROPRANOLOL HYDROCHLORIDE 10 MG: 10 TABLET ORAL at 21:27

## 2025-07-23 RX ADMIN — GABAPENTIN 300 MG: 300 CAPSULE ORAL at 08:17

## 2025-07-23 RX ADMIN — LITHIUM CARBONATE 900 MG: 450 TABLET, EXTENDED RELEASE ORAL at 21:27

## 2025-07-23 RX ADMIN — METHOCARBAMOL 750 MG: 500 TABLET ORAL at 12:27

## 2025-07-23 RX ADMIN — GABAPENTIN 300 MG: 300 CAPSULE ORAL at 21:27

## 2025-07-23 RX ADMIN — HYDROXYZINE HYDROCHLORIDE 25 MG: 25 TABLET, FILM COATED ORAL at 10:40

## 2025-07-24 PROCEDURE — 99232 SBSQ HOSP IP/OBS MODERATE 35: CPT | Performed by: STUDENT IN AN ORGANIZED HEALTH CARE EDUCATION/TRAINING PROGRAM

## 2025-07-24 RX ORDER — AMOXICILLIN 250 MG
1 CAPSULE ORAL
Status: DISCONTINUED | OUTPATIENT
Start: 2025-07-24 | End: 2025-08-04 | Stop reason: HOSPADM

## 2025-07-24 RX ADMIN — QUETIAPINE FUMARATE 300 MG: 300 TABLET ORAL at 21:46

## 2025-07-24 RX ADMIN — GABAPENTIN 300 MG: 300 CAPSULE ORAL at 21:45

## 2025-07-24 RX ADMIN — LITHIUM CARBONATE 900 MG: 450 TABLET, EXTENDED RELEASE ORAL at 21:47

## 2025-07-24 RX ADMIN — PROPRANOLOL HYDROCHLORIDE 10 MG: 10 TABLET ORAL at 21:46

## 2025-07-24 RX ADMIN — METHOCARBAMOL 750 MG: 500 TABLET ORAL at 12:01

## 2025-07-24 RX ADMIN — METHOCARBAMOL 750 MG: 500 TABLET ORAL at 23:02

## 2025-07-24 RX ADMIN — GABAPENTIN 300 MG: 300 CAPSULE ORAL at 15:31

## 2025-07-24 RX ADMIN — SENNOSIDES, DOCUSATE SODIUM 1 TABLET: 50; 8.6 TABLET, FILM COATED ORAL at 21:46

## 2025-07-24 RX ADMIN — BENZTROPINE MESYLATE 1 MG: 1 TABLET ORAL at 21:46

## 2025-07-24 RX ADMIN — METHOCARBAMOL 750 MG: 500 TABLET ORAL at 05:44

## 2025-07-24 RX ADMIN — GABAPENTIN 300 MG: 300 CAPSULE ORAL at 08:39

## 2025-07-24 RX ADMIN — METHOCARBAMOL 750 MG: 500 TABLET ORAL at 17:13

## 2025-07-25 PROCEDURE — 99232 SBSQ HOSP IP/OBS MODERATE 35: CPT | Performed by: STUDENT IN AN ORGANIZED HEALTH CARE EDUCATION/TRAINING PROGRAM

## 2025-07-25 RX ADMIN — QUETIAPINE FUMARATE 300 MG: 300 TABLET ORAL at 21:13

## 2025-07-25 RX ADMIN — SENNOSIDES, DOCUSATE SODIUM 1 TABLET: 50; 8.6 TABLET, FILM COATED ORAL at 21:13

## 2025-07-25 RX ADMIN — METHOCARBAMOL 750 MG: 500 TABLET ORAL at 17:04

## 2025-07-25 RX ADMIN — BENZTROPINE MESYLATE 1 MG: 1 TABLET ORAL at 21:13

## 2025-07-25 RX ADMIN — METHOCARBAMOL 750 MG: 500 TABLET ORAL at 23:03

## 2025-07-25 RX ADMIN — LITHIUM CARBONATE 900 MG: 450 TABLET, EXTENDED RELEASE ORAL at 21:13

## 2025-07-25 RX ADMIN — ACETAMINOPHEN 650 MG: 325 TABLET ORAL at 17:03

## 2025-07-25 RX ADMIN — PROPRANOLOL HYDROCHLORIDE 10 MG: 10 TABLET ORAL at 21:14

## 2025-07-25 RX ADMIN — GABAPENTIN 300 MG: 300 CAPSULE ORAL at 21:14

## 2025-07-25 RX ADMIN — GABAPENTIN 300 MG: 300 CAPSULE ORAL at 15:41

## 2025-07-25 RX ADMIN — GABAPENTIN 300 MG: 300 CAPSULE ORAL at 08:19

## 2025-07-25 RX ADMIN — METHOCARBAMOL 750 MG: 500 TABLET ORAL at 06:03

## 2025-07-25 RX ADMIN — METHOCARBAMOL 750 MG: 500 TABLET ORAL at 11:13

## 2025-07-26 PROCEDURE — 99232 SBSQ HOSP IP/OBS MODERATE 35: CPT

## 2025-07-26 RX ADMIN — GABAPENTIN 300 MG: 300 CAPSULE ORAL at 16:30

## 2025-07-26 RX ADMIN — QUETIAPINE FUMARATE 300 MG: 300 TABLET ORAL at 21:26

## 2025-07-26 RX ADMIN — METHOCARBAMOL 750 MG: 500 TABLET ORAL at 05:47

## 2025-07-26 RX ADMIN — GABAPENTIN 300 MG: 300 CAPSULE ORAL at 08:39

## 2025-07-26 RX ADMIN — PROPRANOLOL HYDROCHLORIDE 10 MG: 10 TABLET ORAL at 21:25

## 2025-07-26 RX ADMIN — METHOCARBAMOL 750 MG: 500 TABLET ORAL at 11:47

## 2025-07-26 RX ADMIN — BENZTROPINE MESYLATE 1 MG: 1 TABLET ORAL at 21:26

## 2025-07-26 RX ADMIN — METHOCARBAMOL 750 MG: 500 TABLET ORAL at 17:32

## 2025-07-26 RX ADMIN — GABAPENTIN 300 MG: 300 CAPSULE ORAL at 21:26

## 2025-07-26 RX ADMIN — LITHIUM CARBONATE 900 MG: 450 TABLET, EXTENDED RELEASE ORAL at 21:25

## 2025-07-26 RX ADMIN — SENNOSIDES, DOCUSATE SODIUM 1 TABLET: 50; 8.6 TABLET, FILM COATED ORAL at 21:26

## 2025-07-26 RX ADMIN — METHOCARBAMOL 750 MG: 500 TABLET ORAL at 23:33

## 2025-07-27 PROCEDURE — 87591 N.GONORRHOEAE DNA AMP PROB: CPT | Performed by: PSYCHIATRY & NEUROLOGY

## 2025-07-27 PROCEDURE — 87491 CHLMYD TRACH DNA AMP PROBE: CPT | Performed by: PSYCHIATRY & NEUROLOGY

## 2025-07-27 PROCEDURE — 99232 SBSQ HOSP IP/OBS MODERATE 35: CPT

## 2025-07-27 RX ADMIN — METHOCARBAMOL 750 MG: 500 TABLET ORAL at 12:20

## 2025-07-27 RX ADMIN — PROPRANOLOL HYDROCHLORIDE 10 MG: 10 TABLET ORAL at 21:18

## 2025-07-27 RX ADMIN — METHOCARBAMOL 750 MG: 500 TABLET ORAL at 17:49

## 2025-07-27 RX ADMIN — METHOCARBAMOL 750 MG: 500 TABLET ORAL at 06:11

## 2025-07-27 RX ADMIN — QUETIAPINE FUMARATE 300 MG: 300 TABLET ORAL at 21:18

## 2025-07-27 RX ADMIN — GABAPENTIN 300 MG: 300 CAPSULE ORAL at 21:17

## 2025-07-27 RX ADMIN — METHOCARBAMOL 750 MG: 500 TABLET ORAL at 23:40

## 2025-07-27 RX ADMIN — LITHIUM CARBONATE 900 MG: 450 TABLET, EXTENDED RELEASE ORAL at 21:18

## 2025-07-27 RX ADMIN — GABAPENTIN 300 MG: 300 CAPSULE ORAL at 16:32

## 2025-07-27 RX ADMIN — GABAPENTIN 300 MG: 300 CAPSULE ORAL at 08:44

## 2025-07-27 RX ADMIN — BENZTROPINE MESYLATE 1 MG: 1 TABLET ORAL at 21:18

## 2025-07-27 RX ADMIN — SENNOSIDES, DOCUSATE SODIUM 1 TABLET: 50; 8.6 TABLET, FILM COATED ORAL at 21:18

## 2025-07-28 LAB
C TRACH DNA SPEC QL NAA+PROBE: NEGATIVE
N GONORRHOEA DNA SPEC QL NAA+PROBE: NEGATIVE

## 2025-07-28 PROCEDURE — 99232 SBSQ HOSP IP/OBS MODERATE 35: CPT | Performed by: STUDENT IN AN ORGANIZED HEALTH CARE EDUCATION/TRAINING PROGRAM

## 2025-07-28 RX ADMIN — METHOCARBAMOL 750 MG: 500 TABLET ORAL at 11:48

## 2025-07-28 RX ADMIN — LITHIUM CARBONATE 900 MG: 450 TABLET, EXTENDED RELEASE ORAL at 21:12

## 2025-07-28 RX ADMIN — SENNOSIDES, DOCUSATE SODIUM 1 TABLET: 50; 8.6 TABLET, FILM COATED ORAL at 21:12

## 2025-07-28 RX ADMIN — PROPRANOLOL HYDROCHLORIDE 10 MG: 10 TABLET ORAL at 21:12

## 2025-07-28 RX ADMIN — GABAPENTIN 300 MG: 300 CAPSULE ORAL at 21:12

## 2025-07-28 RX ADMIN — METHOCARBAMOL 750 MG: 500 TABLET ORAL at 17:18

## 2025-07-28 RX ADMIN — BENZTROPINE MESYLATE 1 MG: 1 TABLET ORAL at 21:12

## 2025-07-28 RX ADMIN — GABAPENTIN 300 MG: 300 CAPSULE ORAL at 16:46

## 2025-07-28 RX ADMIN — QUETIAPINE FUMARATE 300 MG: 300 TABLET ORAL at 21:12

## 2025-07-28 RX ADMIN — METHOCARBAMOL 750 MG: 500 TABLET ORAL at 05:50

## 2025-07-28 RX ADMIN — METHOCARBAMOL 750 MG: 500 TABLET ORAL at 23:51

## 2025-07-28 RX ADMIN — GABAPENTIN 300 MG: 300 CAPSULE ORAL at 08:47

## 2025-07-29 PROBLEM — S51.811A LACERATION OF RIGHT FOREARM: Status: RESOLVED | Noted: 2025-06-29 | Resolved: 2025-07-29

## 2025-07-29 PROBLEM — S51.812A LACERATION OF LEFT FOREARM: Status: RESOLVED | Noted: 2025-06-29 | Resolved: 2025-07-29

## 2025-07-29 PROCEDURE — 99232 SBSQ HOSP IP/OBS MODERATE 35: CPT | Performed by: STUDENT IN AN ORGANIZED HEALTH CARE EDUCATION/TRAINING PROGRAM

## 2025-07-29 RX ADMIN — ACETAMINOPHEN 650 MG: 325 TABLET ORAL at 18:00

## 2025-07-29 RX ADMIN — METHOCARBAMOL 750 MG: 500 TABLET ORAL at 05:51

## 2025-07-29 RX ADMIN — METHOCARBAMOL 750 MG: 500 TABLET ORAL at 11:54

## 2025-07-29 RX ADMIN — SENNOSIDES, DOCUSATE SODIUM 1 TABLET: 50; 8.6 TABLET, FILM COATED ORAL at 21:08

## 2025-07-29 RX ADMIN — GABAPENTIN 300 MG: 300 CAPSULE ORAL at 08:47

## 2025-07-29 RX ADMIN — BENZTROPINE MESYLATE 1 MG: 1 TABLET ORAL at 21:08

## 2025-07-29 RX ADMIN — GABAPENTIN 300 MG: 300 CAPSULE ORAL at 16:29

## 2025-07-29 RX ADMIN — LITHIUM CARBONATE 900 MG: 450 TABLET, EXTENDED RELEASE ORAL at 21:08

## 2025-07-29 RX ADMIN — QUETIAPINE FUMARATE 300 MG: 300 TABLET ORAL at 21:08

## 2025-07-29 RX ADMIN — METHOCARBAMOL 750 MG: 500 TABLET ORAL at 17:59

## 2025-07-29 RX ADMIN — PROPRANOLOL HYDROCHLORIDE 10 MG: 10 TABLET ORAL at 21:08

## 2025-07-29 RX ADMIN — GABAPENTIN 300 MG: 300 CAPSULE ORAL at 21:08

## 2025-07-30 PROCEDURE — 99232 SBSQ HOSP IP/OBS MODERATE 35: CPT | Performed by: STUDENT IN AN ORGANIZED HEALTH CARE EDUCATION/TRAINING PROGRAM

## 2025-07-30 RX ADMIN — GABAPENTIN 300 MG: 300 CAPSULE ORAL at 15:12

## 2025-07-30 RX ADMIN — QUETIAPINE FUMARATE 300 MG: 300 TABLET ORAL at 21:11

## 2025-07-30 RX ADMIN — ACETAMINOPHEN 650 MG: 325 TABLET ORAL at 17:59

## 2025-07-30 RX ADMIN — SENNOSIDES, DOCUSATE SODIUM 1 TABLET: 50; 8.6 TABLET, FILM COATED ORAL at 21:11

## 2025-07-30 RX ADMIN — METHOCARBAMOL 750 MG: 500 TABLET ORAL at 06:03

## 2025-07-30 RX ADMIN — BENZTROPINE MESYLATE 1 MG: 1 TABLET ORAL at 21:11

## 2025-07-30 RX ADMIN — PROPRANOLOL HYDROCHLORIDE 10 MG: 10 TABLET ORAL at 21:11

## 2025-07-30 RX ADMIN — GABAPENTIN 300 MG: 300 CAPSULE ORAL at 21:11

## 2025-07-30 RX ADMIN — LITHIUM CARBONATE 900 MG: 450 TABLET, EXTENDED RELEASE ORAL at 21:11

## 2025-07-30 RX ADMIN — METHOCARBAMOL 750 MG: 500 TABLET ORAL at 01:09

## 2025-07-30 RX ADMIN — GABAPENTIN 300 MG: 300 CAPSULE ORAL at 08:46

## 2025-07-30 RX ADMIN — METHOCARBAMOL 750 MG: 500 TABLET ORAL at 23:31

## 2025-07-30 RX ADMIN — METHOCARBAMOL 750 MG: 500 TABLET ORAL at 12:08

## 2025-07-30 RX ADMIN — METHOCARBAMOL 750 MG: 500 TABLET ORAL at 17:07

## 2025-07-31 PROCEDURE — 99232 SBSQ HOSP IP/OBS MODERATE 35: CPT | Performed by: STUDENT IN AN ORGANIZED HEALTH CARE EDUCATION/TRAINING PROGRAM

## 2025-07-31 RX ORDER — GABAPENTIN 300 MG/1
300 CAPSULE ORAL 2 TIMES DAILY
Status: DISCONTINUED | OUTPATIENT
Start: 2025-07-31 | End: 2025-08-04 | Stop reason: HOSPADM

## 2025-07-31 RX ORDER — BENZTROPINE MESYLATE 1 MG/1
1 TABLET ORAL
Status: DISCONTINUED | OUTPATIENT
Start: 2025-07-31 | End: 2025-08-04 | Stop reason: HOSPADM

## 2025-07-31 RX ORDER — GABAPENTIN 300 MG/1
600 CAPSULE ORAL
Status: DISCONTINUED | OUTPATIENT
Start: 2025-07-31 | End: 2025-08-04 | Stop reason: HOSPADM

## 2025-07-31 RX ADMIN — METHOCARBAMOL 750 MG: 500 TABLET ORAL at 05:32

## 2025-07-31 RX ADMIN — GABAPENTIN 300 MG: 300 CAPSULE ORAL at 08:56

## 2025-07-31 RX ADMIN — PROPRANOLOL HYDROCHLORIDE 10 MG: 10 TABLET ORAL at 21:14

## 2025-07-31 RX ADMIN — BENZTROPINE MESYLATE 1 MG: 1 TABLET ORAL at 20:15

## 2025-07-31 RX ADMIN — METHOCARBAMOL 750 MG: 500 TABLET ORAL at 17:29

## 2025-07-31 RX ADMIN — LITHIUM CARBONATE 900 MG: 450 TABLET, EXTENDED RELEASE ORAL at 21:14

## 2025-07-31 RX ADMIN — METHOCARBAMOL 750 MG: 500 TABLET ORAL at 11:55

## 2025-07-31 RX ADMIN — GABAPENTIN 600 MG: 300 CAPSULE ORAL at 21:13

## 2025-07-31 RX ADMIN — SENNOSIDES, DOCUSATE SODIUM 1 TABLET: 50; 8.6 TABLET, FILM COATED ORAL at 21:14

## 2025-07-31 RX ADMIN — GABAPENTIN 300 MG: 300 CAPSULE ORAL at 16:17

## 2025-07-31 RX ADMIN — QUETIAPINE FUMARATE 300 MG: 300 TABLET ORAL at 21:14

## 2025-08-01 PROBLEM — Z00.8 MEDICAL CLEARANCE FOR PSYCHIATRIC ADMISSION: Status: RESOLVED | Noted: 2025-07-02 | Resolved: 2025-08-01

## 2025-08-01 PROBLEM — F29 PSYCHOSIS (HCC): Status: RESOLVED | Noted: 2025-06-30 | Resolved: 2025-08-01

## 2025-08-01 PROCEDURE — 99232 SBSQ HOSP IP/OBS MODERATE 35: CPT | Performed by: STUDENT IN AN ORGANIZED HEALTH CARE EDUCATION/TRAINING PROGRAM

## 2025-08-01 RX ADMIN — GABAPENTIN 600 MG: 300 CAPSULE ORAL at 21:18

## 2025-08-01 RX ADMIN — GABAPENTIN 300 MG: 300 CAPSULE ORAL at 16:40

## 2025-08-01 RX ADMIN — METHOCARBAMOL 750 MG: 500 TABLET ORAL at 06:43

## 2025-08-01 RX ADMIN — LITHIUM CARBONATE 900 MG: 450 TABLET, EXTENDED RELEASE ORAL at 21:18

## 2025-08-01 RX ADMIN — METHOCARBAMOL 750 MG: 500 TABLET ORAL at 11:53

## 2025-08-01 RX ADMIN — BENZTROPINE MESYLATE 1 MG: 1 TABLET ORAL at 20:08

## 2025-08-01 RX ADMIN — SENNOSIDES, DOCUSATE SODIUM 1 TABLET: 50; 8.6 TABLET, FILM COATED ORAL at 21:18

## 2025-08-01 RX ADMIN — GABAPENTIN 300 MG: 300 CAPSULE ORAL at 08:43

## 2025-08-01 RX ADMIN — METHOCARBAMOL 750 MG: 500 TABLET ORAL at 01:52

## 2025-08-01 RX ADMIN — QUETIAPINE FUMARATE 300 MG: 300 TABLET ORAL at 21:18

## 2025-08-01 RX ADMIN — METHOCARBAMOL 750 MG: 500 TABLET ORAL at 17:09

## 2025-08-01 RX ADMIN — PROPRANOLOL HYDROCHLORIDE 10 MG: 10 TABLET ORAL at 21:18

## 2025-08-02 PROCEDURE — 99232 SBSQ HOSP IP/OBS MODERATE 35: CPT | Performed by: PSYCHIATRY & NEUROLOGY

## 2025-08-02 RX ADMIN — QUETIAPINE FUMARATE 300 MG: 300 TABLET ORAL at 21:16

## 2025-08-02 RX ADMIN — PROPRANOLOL HYDROCHLORIDE 10 MG: 10 TABLET ORAL at 21:16

## 2025-08-02 RX ADMIN — GABAPENTIN 600 MG: 300 CAPSULE ORAL at 21:16

## 2025-08-02 RX ADMIN — ACETAMINOPHEN 650 MG: 325 TABLET ORAL at 16:51

## 2025-08-02 RX ADMIN — SENNOSIDES, DOCUSATE SODIUM 1 TABLET: 50; 8.6 TABLET, FILM COATED ORAL at 21:16

## 2025-08-02 RX ADMIN — ACETAMINOPHEN 650 MG: 325 TABLET ORAL at 21:19

## 2025-08-02 RX ADMIN — LITHIUM CARBONATE 900 MG: 450 TABLET, EXTENDED RELEASE ORAL at 21:16

## 2025-08-02 RX ADMIN — BENZTROPINE MESYLATE 1 MG: 1 TABLET ORAL at 20:04

## 2025-08-02 RX ADMIN — METHOCARBAMOL 750 MG: 500 TABLET ORAL at 11:44

## 2025-08-02 RX ADMIN — METHOCARBAMOL 750 MG: 500 TABLET ORAL at 17:44

## 2025-08-02 RX ADMIN — GABAPENTIN 300 MG: 300 CAPSULE ORAL at 08:56

## 2025-08-02 RX ADMIN — METHOCARBAMOL 750 MG: 500 TABLET ORAL at 01:25

## 2025-08-02 RX ADMIN — GABAPENTIN 300 MG: 300 CAPSULE ORAL at 16:49

## 2025-08-02 RX ADMIN — METHOCARBAMOL 750 MG: 500 TABLET ORAL at 23:01

## 2025-08-02 RX ADMIN — METHOCARBAMOL 750 MG: 500 TABLET ORAL at 06:30

## 2025-08-03 PROCEDURE — 99232 SBSQ HOSP IP/OBS MODERATE 35: CPT | Performed by: PSYCHIATRY & NEUROLOGY

## 2025-08-03 RX ADMIN — METHOCARBAMOL 750 MG: 500 TABLET ORAL at 17:14

## 2025-08-03 RX ADMIN — METHOCARBAMOL 750 MG: 500 TABLET ORAL at 05:57

## 2025-08-03 RX ADMIN — GABAPENTIN 300 MG: 300 CAPSULE ORAL at 08:52

## 2025-08-03 RX ADMIN — GABAPENTIN 600 MG: 300 CAPSULE ORAL at 21:16

## 2025-08-03 RX ADMIN — LITHIUM CARBONATE 900 MG: 450 TABLET, EXTENDED RELEASE ORAL at 21:16

## 2025-08-03 RX ADMIN — QUETIAPINE FUMARATE 300 MG: 300 TABLET ORAL at 21:16

## 2025-08-03 RX ADMIN — PROPRANOLOL HYDROCHLORIDE 10 MG: 10 TABLET ORAL at 21:16

## 2025-08-03 RX ADMIN — METHOCARBAMOL 750 MG: 500 TABLET ORAL at 23:10

## 2025-08-03 RX ADMIN — METHOCARBAMOL 750 MG: 500 TABLET ORAL at 12:01

## 2025-08-03 RX ADMIN — SENNOSIDES, DOCUSATE SODIUM 1 TABLET: 50; 8.6 TABLET, FILM COATED ORAL at 21:17

## 2025-08-03 RX ADMIN — BENZTROPINE MESYLATE 1 MG: 1 TABLET ORAL at 20:02

## 2025-08-03 RX ADMIN — GABAPENTIN 300 MG: 300 CAPSULE ORAL at 16:51

## 2025-08-04 VITALS
OXYGEN SATURATION: 98 % | DIASTOLIC BLOOD PRESSURE: 82 MMHG | RESPIRATION RATE: 18 BRPM | WEIGHT: 190.6 LBS | BODY MASS INDEX: 25.26 KG/M2 | SYSTOLIC BLOOD PRESSURE: 120 MMHG | TEMPERATURE: 97.8 F | HEART RATE: 80 BPM | HEIGHT: 73 IN

## 2025-08-04 PROCEDURE — 99239 HOSP IP/OBS DSCHRG MGMT >30: CPT | Performed by: STUDENT IN AN ORGANIZED HEALTH CARE EDUCATION/TRAINING PROGRAM

## 2025-08-04 RX ORDER — LITHIUM CARBONATE 450 MG
900 TABLET, EXTENDED RELEASE ORAL
Qty: 60 TABLET | Refills: 1 | Status: SHIPPED | OUTPATIENT
Start: 2025-08-04

## 2025-08-04 RX ORDER — AMOXICILLIN 250 MG
1 CAPSULE ORAL
Qty: 30 TABLET | Refills: 0 | Status: SHIPPED | OUTPATIENT
Start: 2025-08-04

## 2025-08-04 RX ORDER — GABAPENTIN 300 MG/1
300 CAPSULE ORAL SEE ADMIN INSTRUCTIONS
Qty: 120 CAPSULE | Refills: 1 | Status: SHIPPED | OUTPATIENT
Start: 2025-08-04

## 2025-08-04 RX ORDER — BENZTROPINE MESYLATE 1 MG/1
1 TABLET ORAL
Qty: 30 TABLET | Refills: 1 | Status: SHIPPED | OUTPATIENT
Start: 2025-08-04

## 2025-08-04 RX ORDER — QUETIAPINE FUMARATE 300 MG/1
300 TABLET, FILM COATED ORAL
Qty: 30 TABLET | Refills: 1 | Status: SHIPPED | OUTPATIENT
Start: 2025-08-04

## 2025-08-04 RX ORDER — PROPRANOLOL HYDROCHLORIDE 10 MG/1
10 TABLET ORAL
Qty: 30 TABLET | Refills: 1 | Status: SHIPPED | OUTPATIENT
Start: 2025-08-04

## 2025-08-04 RX ADMIN — METHOCARBAMOL 750 MG: 500 TABLET ORAL at 06:06

## 2025-08-04 RX ADMIN — METHOCARBAMOL 750 MG: 500 TABLET ORAL at 11:28

## 2025-08-04 RX ADMIN — GABAPENTIN 300 MG: 300 CAPSULE ORAL at 08:58

## (undated) DEVICE — Device

## (undated) DEVICE — BETHLEHEM UNIV MAJOR ORTHO,KIT: Brand: CARDINAL HEALTH

## (undated) DEVICE — INTENDED FOR TISSUE SEPARATION, AND OTHER PROCEDURES THAT REQUIRE A SHARP SURGICAL BLADE TO PUNCTURE OR CUT.: Brand: BARD-PARKER SAFETY BLADES SIZE 15, STERILE

## (undated) DEVICE — 3000CC GUARDIAN II: Brand: GUARDIAN

## (undated) DEVICE — REM POLYHESIVE ADULT PATIENT RETURN ELECTRODE: Brand: VALLEYLAB

## (undated) DEVICE — PREMIUM DRY TRAY LF: Brand: MEDLINE INDUSTRIES, INC.

## (undated) DEVICE — NEPTUNE E-SEP SMOKE EVACUATION PENCIL, COATED, 70MM BLADE, PUSH BUTTON SWITCH: Brand: NEPTUNE E-SEP

## (undated) DEVICE — SURGICEL 4 X 8IN

## (undated) DEVICE — VAC DRESSING SENSATRAC SMALL